# Patient Record
Sex: FEMALE | Race: BLACK OR AFRICAN AMERICAN | NOT HISPANIC OR LATINO | URBAN - METROPOLITAN AREA
[De-identification: names, ages, dates, MRNs, and addresses within clinical notes are randomized per-mention and may not be internally consistent; named-entity substitution may affect disease eponyms.]

---

## 2017-01-24 ENCOUNTER — INPATIENT (INPATIENT)
Facility: HOSPITAL | Age: 57
LOS: 7 days | Discharge: HOME | End: 2017-02-01
Attending: INTERNAL MEDICINE | Admitting: INTERNAL MEDICINE
Payer: MEDICAID

## 2017-01-24 VITALS
SYSTOLIC BLOOD PRESSURE: 127 MMHG | DIASTOLIC BLOOD PRESSURE: 102 MMHG | RESPIRATION RATE: 106 BRPM | WEIGHT: 89.95 LBS | HEART RATE: 106 BPM | OXYGEN SATURATION: 100 % | HEIGHT: 64 IN

## 2017-01-24 LAB
ALBUMIN SERPL ELPH-MCNC: 4.3 G/DL — SIGNIFICANT CHANGE UP (ref 3.3–5)
ALP SERPL-CCNC: 118 U/L — SIGNIFICANT CHANGE UP (ref 40–120)
ALT FLD-CCNC: 58 U/L — SIGNIFICANT CHANGE UP (ref 12–78)
ANION GAP SERPL CALC-SCNC: 7 MMOL/L — SIGNIFICANT CHANGE UP (ref 5–17)
APTT BLD: 26.8 SEC — LOW (ref 27.5–37.4)
AST SERPL-CCNC: 48 U/L — HIGH (ref 15–37)
BASOPHILS # BLD AUTO: 0.1 K/UL — SIGNIFICANT CHANGE UP (ref 0–0.2)
BASOPHILS NFR BLD AUTO: 1 % — SIGNIFICANT CHANGE UP (ref 0–2)
BILIRUB SERPL-MCNC: 0.4 MG/DL — SIGNIFICANT CHANGE UP (ref 0.2–1.2)
BUN SERPL-MCNC: 16 MG/DL — SIGNIFICANT CHANGE UP (ref 7–23)
CALCIUM SERPL-MCNC: 8.6 MG/DL — SIGNIFICANT CHANGE UP (ref 8.5–10.1)
CHLORIDE SERPL-SCNC: 107 MMOL/L — SIGNIFICANT CHANGE UP (ref 96–108)
CO2 SERPL-SCNC: 32 MMOL/L — HIGH (ref 22–31)
CREAT SERPL-MCNC: 0.88 MG/DL — SIGNIFICANT CHANGE UP (ref 0.5–1.3)
D DIMER BLD IA.RAPID-MCNC: <150 NG/ML DDU — SIGNIFICANT CHANGE UP
EOSINOPHIL # BLD AUTO: 0.2 K/UL — SIGNIFICANT CHANGE UP (ref 0–0.5)
EOSINOPHIL NFR BLD AUTO: 1.8 % — SIGNIFICANT CHANGE UP (ref 0–6)
FLUAV SPEC QL CULT: NEGATIVE — SIGNIFICANT CHANGE UP
FLUBV AG SPEC QL IA: NEGATIVE — SIGNIFICANT CHANGE UP
GLUCOSE SERPL-MCNC: 107 MG/DL — HIGH (ref 70–99)
HCG SERPL-ACNC: 2 MIU/ML — SIGNIFICANT CHANGE UP
HCT VFR BLD CALC: 38.3 % — SIGNIFICANT CHANGE UP (ref 34.5–45)
HGB BLD-MCNC: 12.9 G/DL — SIGNIFICANT CHANGE UP (ref 11.5–15.5)
INR BLD: 0.96 RATIO — SIGNIFICANT CHANGE UP (ref 0.88–1.16)
LACTATE SERPL-SCNC: 0.6 MMOL/L — LOW (ref 0.7–2)
LYMPHOCYTES # BLD AUTO: 1.6 K/UL — SIGNIFICANT CHANGE UP (ref 1–3.3)
LYMPHOCYTES # BLD AUTO: 16.4 % — SIGNIFICANT CHANGE UP (ref 13–44)
MCHC RBC-ENTMCNC: 30.1 PG — SIGNIFICANT CHANGE UP (ref 27–34)
MCHC RBC-ENTMCNC: 33.6 GM/DL — SIGNIFICANT CHANGE UP (ref 32–36)
MCV RBC AUTO: 89.8 FL — SIGNIFICANT CHANGE UP (ref 80–100)
MONOCYTES # BLD AUTO: 0.6 K/UL — SIGNIFICANT CHANGE UP (ref 0–0.9)
MONOCYTES NFR BLD AUTO: 6.6 % — SIGNIFICANT CHANGE UP (ref 2–14)
NEUTROPHILS # BLD AUTO: 7.2 K/UL — SIGNIFICANT CHANGE UP (ref 1.8–7.4)
NEUTROPHILS NFR BLD AUTO: 74.2 % — SIGNIFICANT CHANGE UP (ref 43–77)
PLATELET # BLD AUTO: 208 K/UL — SIGNIFICANT CHANGE UP (ref 150–400)
POTASSIUM SERPL-MCNC: 3.9 MMOL/L — SIGNIFICANT CHANGE UP (ref 3.5–5.3)
POTASSIUM SERPL-SCNC: 3.9 MMOL/L — SIGNIFICANT CHANGE UP (ref 3.5–5.3)
PROT SERPL-MCNC: 7.9 GM/DL — SIGNIFICANT CHANGE UP (ref 6–8.3)
PROTHROM AB SERPL-ACNC: 10.7 SEC — SIGNIFICANT CHANGE UP (ref 10–13.1)
RBC # BLD: 4.27 M/UL — SIGNIFICANT CHANGE UP (ref 3.8–5.2)
RBC # FLD: 13.4 % — SIGNIFICANT CHANGE UP (ref 11–15)
SODIUM SERPL-SCNC: 146 MMOL/L — HIGH (ref 135–145)
WBC # BLD: 9.7 K/UL — SIGNIFICANT CHANGE UP (ref 3.8–10.5)
WBC # FLD AUTO: 9.7 K/UL — SIGNIFICANT CHANGE UP (ref 3.8–10.5)

## 2017-01-24 PROCEDURE — 99285 EMERGENCY DEPT VISIT HI MDM: CPT

## 2017-01-24 PROCEDURE — 71010: CPT | Mod: 26

## 2017-01-24 PROCEDURE — 71250 CT THORAX DX C-: CPT | Mod: 26

## 2017-01-24 RX ORDER — SODIUM CHLORIDE 9 MG/ML
1000 INJECTION INTRAMUSCULAR; INTRAVENOUS; SUBCUTANEOUS ONCE
Qty: 0 | Refills: 0 | Status: COMPLETED | OUTPATIENT
Start: 2017-01-24 | End: 2017-01-24

## 2017-01-24 RX ORDER — ACETAMINOPHEN 500 MG
975 TABLET ORAL ONCE
Qty: 0 | Refills: 0 | Status: COMPLETED | OUTPATIENT
Start: 2017-01-24 | End: 2017-01-24

## 2017-01-24 RX ORDER — AZITHROMYCIN 500 MG/1
500 TABLET, FILM COATED ORAL ONCE
Qty: 0 | Refills: 0 | Status: COMPLETED | OUTPATIENT
Start: 2017-01-24 | End: 2017-01-24

## 2017-01-24 RX ORDER — MAGNESIUM SULFATE 500 MG/ML
1 VIAL (ML) INJECTION ONCE
Qty: 0 | Refills: 0 | Status: COMPLETED | OUTPATIENT
Start: 2017-01-24 | End: 2017-01-24

## 2017-01-24 RX ADMIN — Medication 40 MILLIGRAM(S): at 15:28

## 2017-01-24 RX ADMIN — Medication 975 MILLIGRAM(S): at 20:35

## 2017-01-24 RX ADMIN — AZITHROMYCIN 255 MILLIGRAM(S): 500 TABLET, FILM COATED ORAL at 22:35

## 2017-01-24 RX ADMIN — SODIUM CHLORIDE 1000 MILLILITER(S): 9 INJECTION INTRAMUSCULAR; INTRAVENOUS; SUBCUTANEOUS at 14:40

## 2017-01-24 RX ADMIN — Medication 975 MILLIGRAM(S): at 22:27

## 2017-01-24 RX ADMIN — Medication 100 GRAM(S): at 15:33

## 2017-01-24 NOTE — ED PROVIDER NOTE - CONSTITUTIONAL, MLM
normal... Thin appearing, well nourished, awake, alert, oriented to person, place, time/situation and in no apparent distress.

## 2017-01-24 NOTE — ED PROVIDER NOTE - PROGRESS NOTE DETAILS
pt refused ABG pt refused ct angio stating that she wants to consult her pmd, pt told that there is a concern for PE due to her recent travel and PE and cause death, pt is being very difficulty and states she will discuss it with her pmd tmr, when asked for the name of her pmd pt refused to give the name, she was sent back from ct

## 2017-01-24 NOTE — ED PROVIDER NOTE - OBJECTIVE STATEMENT
57 year old female with h/o asthma and HIV presents today c/o two week history of productive cough, sob and wheezing, she did see her PMD last week and given antibiotics, however, pt states that she went on a cruise and forgot to take the medications (-) Fevers (-) vomiting

## 2017-01-24 NOTE — ED PROVIDER NOTE - MEDICAL DECISION MAKING DETAILS
pt presented with cough, sob and wheezing, pt also recently travelled, pt refused abg, refused ct angio, d- dimer done and was negative, pt agreed to ct without contrast, zithromax, flu swabs, duonebs, solumedrol, and magnesium

## 2017-01-24 NOTE — ED ADULT TRIAGE NOTE - CHIEF COMPLAINT QUOTE
Wheezing prior to arrival, received two albuterol nebulizer treatments by [facility] w/ relief, hx of asthma, moist non-productive cough, denies fevers chills

## 2017-01-25 DIAGNOSIS — J45.42 MODERATE PERSISTENT ASTHMA WITH STATUS ASTHMATICUS: ICD-10-CM

## 2017-01-25 DIAGNOSIS — J45.902 UNSPECIFIED ASTHMA WITH STATUS ASTHMATICUS: ICD-10-CM

## 2017-01-25 DIAGNOSIS — Z98.891 HISTORY OF UTERINE SCAR FROM PREVIOUS SURGERY: Chronic | ICD-10-CM

## 2017-01-25 DIAGNOSIS — Z21 ASYMPTOMATIC HUMAN IMMUNODEFICIENCY VIRUS [HIV] INFECTION STATUS: ICD-10-CM

## 2017-01-25 DIAGNOSIS — F14.10 COCAINE ABUSE, UNCOMPLICATED: ICD-10-CM

## 2017-01-25 DIAGNOSIS — Z29.9 ENCOUNTER FOR PROPHYLACTIC MEASURES, UNSPECIFIED: ICD-10-CM

## 2017-01-25 LAB
AMPHET UR-MCNC: NEGATIVE — SIGNIFICANT CHANGE UP
ANION GAP SERPL CALC-SCNC: 7 MMOL/L — SIGNIFICANT CHANGE UP (ref 5–17)
APPEARANCE UR: CLEAR — SIGNIFICANT CHANGE UP
BACTERIA # UR AUTO: ABNORMAL
BARBITURATES UR SCN-MCNC: NEGATIVE — SIGNIFICANT CHANGE UP
BENZODIAZ UR-MCNC: NEGATIVE — SIGNIFICANT CHANGE UP
BILIRUB UR-MCNC: NEGATIVE — SIGNIFICANT CHANGE UP
BUN SERPL-MCNC: 16 MG/DL — SIGNIFICANT CHANGE UP (ref 7–23)
CALCIUM SERPL-MCNC: 8.6 MG/DL — SIGNIFICANT CHANGE UP (ref 8.5–10.1)
CHLORIDE SERPL-SCNC: 107 MMOL/L — SIGNIFICANT CHANGE UP (ref 96–108)
CO2 SERPL-SCNC: 32 MMOL/L — HIGH (ref 22–31)
COCAINE METAB.OTHER UR-MCNC: POSITIVE — SIGNIFICANT CHANGE UP
COLOR SPEC: YELLOW — SIGNIFICANT CHANGE UP
COMMENT - URINE: SIGNIFICANT CHANGE UP
CREAT SERPL-MCNC: 0.79 MG/DL — SIGNIFICANT CHANGE UP (ref 0.5–1.3)
DIFF PNL FLD: ABNORMAL
GLUCOSE SERPL-MCNC: 146 MG/DL — HIGH (ref 70–99)
GLUCOSE UR QL: NEGATIVE MG/DL — SIGNIFICANT CHANGE UP
HCT VFR BLD CALC: 34.5 % — SIGNIFICANT CHANGE UP (ref 34.5–45)
HGB BLD-MCNC: 12.1 G/DL — SIGNIFICANT CHANGE UP (ref 11.5–15.5)
KETONES UR-MCNC: NEGATIVE — SIGNIFICANT CHANGE UP
LACTATE SERPL-SCNC: 1.6 MMOL/L — SIGNIFICANT CHANGE UP (ref 0.7–2)
LACTATE SERPL-SCNC: 2.1 MMOL/L — HIGH (ref 0.7–2)
LEUKOCYTE ESTERASE UR-ACNC: NEGATIVE — SIGNIFICANT CHANGE UP
MCHC RBC-ENTMCNC: 30.4 PG — SIGNIFICANT CHANGE UP (ref 27–34)
MCHC RBC-ENTMCNC: 35 GM/DL — SIGNIFICANT CHANGE UP (ref 32–36)
MCV RBC AUTO: 87 FL — SIGNIFICANT CHANGE UP (ref 80–100)
METHADONE UR-MCNC: NEGATIVE — SIGNIFICANT CHANGE UP
NITRITE UR-MCNC: NEGATIVE — SIGNIFICANT CHANGE UP
OPIATES UR-MCNC: NEGATIVE — SIGNIFICANT CHANGE UP
PCP SPEC-MCNC: SIGNIFICANT CHANGE UP
PCP UR-MCNC: NEGATIVE — SIGNIFICANT CHANGE UP
PH UR: 5 — SIGNIFICANT CHANGE UP (ref 4.8–8)
PLATELET # BLD AUTO: 222 K/UL — SIGNIFICANT CHANGE UP (ref 150–400)
POTASSIUM SERPL-MCNC: 4.4 MMOL/L — SIGNIFICANT CHANGE UP (ref 3.5–5.3)
POTASSIUM SERPL-SCNC: 4.4 MMOL/L — SIGNIFICANT CHANGE UP (ref 3.5–5.3)
PROT UR-MCNC: 30 MG/DL
RBC # BLD: 3.96 M/UL — SIGNIFICANT CHANGE UP (ref 3.8–5.2)
RBC # FLD: 12.7 % — SIGNIFICANT CHANGE UP (ref 11–15)
SODIUM SERPL-SCNC: 146 MMOL/L — HIGH (ref 135–145)
SP GR SPEC: 1.02 — SIGNIFICANT CHANGE UP (ref 1.01–1.02)
THC UR QL: NEGATIVE — SIGNIFICANT CHANGE UP
UROBILINOGEN FLD QL: NEGATIVE MG/DL — SIGNIFICANT CHANGE UP
WBC # BLD: 6.1 K/UL — SIGNIFICANT CHANGE UP (ref 3.8–10.5)
WBC # FLD AUTO: 6.1 K/UL — SIGNIFICANT CHANGE UP (ref 3.8–10.5)
WBC UR QL: SIGNIFICANT CHANGE UP

## 2017-01-25 PROCEDURE — 99232 SBSQ HOSP IP/OBS MODERATE 35: CPT

## 2017-01-25 PROCEDURE — 99233 SBSQ HOSP IP/OBS HIGH 50: CPT

## 2017-01-25 RX ORDER — RALTEGRAVIR 400 MG/1
400 TABLET, FILM COATED ORAL
Qty: 0 | Refills: 0 | Status: DISCONTINUED | OUTPATIENT
Start: 2017-01-25 | End: 2017-02-01

## 2017-01-25 RX ORDER — RITONAVIR 100 MG/1
100 TABLET, FILM COATED ORAL
Qty: 0 | Refills: 0 | Status: DISCONTINUED | OUTPATIENT
Start: 2017-01-25 | End: 2017-01-26

## 2017-01-25 RX ORDER — RITONAVIR 100 MG/1
100 TABLET, FILM COATED ORAL
Qty: 0 | Refills: 0 | Status: DISCONTINUED | OUTPATIENT
Start: 2017-01-25 | End: 2017-01-25

## 2017-01-25 RX ORDER — IPRATROPIUM/ALBUTEROL SULFATE 18-103MCG
3 AEROSOL WITH ADAPTER (GRAM) INHALATION ONCE
Qty: 0 | Refills: 0 | Status: COMPLETED | OUTPATIENT
Start: 2017-01-25 | End: 2017-01-25

## 2017-01-25 RX ORDER — IPRATROPIUM/ALBUTEROL SULFATE 18-103MCG
3 AEROSOL WITH ADAPTER (GRAM) INHALATION EVERY 6 HOURS
Qty: 0 | Refills: 0 | Status: DISCONTINUED | OUTPATIENT
Start: 2017-01-25 | End: 2017-01-27

## 2017-01-25 RX ORDER — HEPARIN SODIUM 5000 [USP'U]/ML
5000 INJECTION INTRAVENOUS; SUBCUTANEOUS EVERY 12 HOURS
Qty: 0 | Refills: 0 | Status: DISCONTINUED | OUTPATIENT
Start: 2017-01-25 | End: 2017-02-01

## 2017-01-25 RX ORDER — FAMOTIDINE 10 MG/ML
20 INJECTION INTRAVENOUS DAILY
Qty: 0 | Refills: 0 | Status: DISCONTINUED | OUTPATIENT
Start: 2017-01-25 | End: 2017-02-01

## 2017-01-25 RX ORDER — DARUNAVIR 75 MG/1
800 TABLET, FILM COATED ORAL DAILY
Qty: 0 | Refills: 0 | Status: DISCONTINUED | OUTPATIENT
Start: 2017-01-25 | End: 2017-02-01

## 2017-01-25 RX ADMIN — HEPARIN SODIUM 5000 UNIT(S): 5000 INJECTION INTRAVENOUS; SUBCUTANEOUS at 17:17

## 2017-01-25 RX ADMIN — RALTEGRAVIR 400 MILLIGRAM(S): 400 TABLET, FILM COATED ORAL at 17:18

## 2017-01-25 RX ADMIN — FAMOTIDINE 20 MILLIGRAM(S): 10 INJECTION INTRAVENOUS at 11:39

## 2017-01-25 RX ADMIN — Medication 200 MILLIGRAM(S): at 16:22

## 2017-01-25 RX ADMIN — Medication 3 MILLILITER(S): at 07:07

## 2017-01-25 RX ADMIN — Medication 200 MILLIGRAM(S): at 22:06

## 2017-01-25 RX ADMIN — Medication 40 MILLIGRAM(S): at 13:17

## 2017-01-25 RX ADMIN — RITONAVIR 100 MILLIGRAM(S): 100 TABLET, FILM COATED ORAL at 06:46

## 2017-01-25 RX ADMIN — HEPARIN SODIUM 5000 UNIT(S): 5000 INJECTION INTRAVENOUS; SUBCUTANEOUS at 06:15

## 2017-01-25 RX ADMIN — Medication 125 MILLIGRAM(S): at 01:06

## 2017-01-25 RX ADMIN — Medication 3 MILLILITER(S): at 18:15

## 2017-01-25 RX ADMIN — RALTEGRAVIR 400 MILLIGRAM(S): 400 TABLET, FILM COATED ORAL at 06:16

## 2017-01-25 RX ADMIN — RITONAVIR 100 MILLIGRAM(S): 100 TABLET, FILM COATED ORAL at 17:46

## 2017-01-25 RX ADMIN — Medication 3 MILLILITER(S): at 00:41

## 2017-01-25 RX ADMIN — Medication 40 MILLIGRAM(S): at 22:04

## 2017-01-25 RX ADMIN — DARUNAVIR 800 MILLIGRAM(S): 75 TABLET, FILM COATED ORAL at 11:39

## 2017-01-25 RX ADMIN — Medication 3 MILLILITER(S): at 11:42

## 2017-01-25 RX ADMIN — Medication 40 MILLIGRAM(S): at 06:14

## 2017-01-25 NOTE — PROGRESS NOTE ADULT - SUBJECTIVE AND OBJECTIVE BOX
Patient is a 57y old  Female who presents with a chief complaint of Shortness of breath (2017 00:47)      INTERVAL HPI/OVERNIGHT EVENTS:   events noted    MEDICATIONS  (STANDING):  ALBUTerol/ipratropium for Nebulization 3milliLiter(s) Nebulizer every 6 hours  methylPREDNISolone sodium succinate Injectable 40milliGRAM(s) IV Push every 8 hours  heparin  Injectable 5000Unit(s) SubCutaneous every 12 hours  raltegravir Tablet 400milliGRAM(s) Oral two times a day  darunavir 800milliGRAM(s) Oral daily  ritonavir Capsule 100milliGRAM(s) Oral two times a day  famotidine    Tablet 20milliGRAM(s) Oral daily    MEDICATIONS  (PRN):      Allergies    No Known Allergies    Intolerances        REVIEW OF SYSTEMS:  CONSTITUTIONAL: No fever, weight loss, or fatigue  EYES: No eye pain, visual disturbances, or discharge  ENMT:  No difficulty hearing, tinnitus, vertigo; No sinus or throat pain  NECK: No pain or stiffness  BREASTS: No pain, masses, or nipple discharge  RESPIRATORY: No cough, wheezing, chills or hemoptysis; No shortness of breath  CARDIOVASCULAR: No chest pain, palpitations, dizziness, or leg swelling  GASTROINTESTINAL: No abdominal or epigastric pain. No nausea, vomiting, or hematemesis; No diarrhea or constipation. No melena or hematochezia.  GENITOURINARY: No dysuria, frequency, hematuria, or incontinence  NEUROLOGICAL: No headaches, memory loss, loss of strength, numbness, or tremors  SKIN: No itching, burning, rashes, or lesions   LYMPH NODES: No enlarged glands  ENDOCRINE: No heat or cold intolerance; No hair loss  MUSCULOSKELETAL: No joint pain or swelling; No muscle, back, or extremity pain  PSYCHIATRIC: No depression, anxiety, mood swings, or difficulty sleeping  HEME/LYMPH: No easy bruising, or bleeding gums  ALLERGY AND IMMUNOLOGIC: No hives or eczema    Vital Signs Last 24 Hrs  T(C): 37, Max: 37.5 ( @ 18:16)  T(F): 98.6, Max: 99.5 ( @ 18:16)  HR: 67 (17 - 106)  BP: 128/72 (97/60 - 128/72)  BP(mean): --  RR: 18 (16 - 106)  SpO2: 100% (97% - 100%)    PHYSICAL EXAM:  GENERAL: NAD, well-groomed, well-developed  HEAD:  Atraumatic, Normocephalic  EYES: EOMI, PERRLA, conjunctiva and sclera clear  ENMT: No tonsillar erythema, exudates, or enlargement; Moist mucous membranes, Good dentition, No lesions  NECK: Supple, No JVD, Normal thyroid  NERVOUS SYSTEM:  Alert & Oriented X3, Good concentration; Motor Strength 5/5 B/L upper and lower extremities; DTRs 2+ intact and symmetric  CHEST/LUNG: Clear to percussion bilaterally; No rales, rhonchi, wheezing, or rubs  HEART: Regular rate and rhythm; No murmurs, rubs, or gallops  ABDOMEN: Soft, Nontender, Nondistended; Bowel sounds present  EXTREMITIES:  2+ Peripheral Pulses, No clubbing, cyanosis, or edema  LYMPH: No lymphadenopathy noted  SKIN: No rashes or lesions    LABS:                        12.9   9.7   )-----------( 208      ( 2017 14:37 )             38.3     2017 14:37    146    |  107    |  16     ----------------------------<  107    3.9     |  32     |  0.88     Ca    8.6        2017 14:37    TPro  7.9    /  Alb  4.3    /  TBili  0.4    /  DBili  x      /  AST  48     /  ALT  58     /  AlkPhos  118    2017 14:37    PT/INR - ( 2017 18:32 )   PT: 10.7 sec;   INR: 0.96 ratio         PTT - ( 2017 18:32 )  PTT:26.8 sec  Urinalysis Basic - ( 2017 04:52 )    Color: Yellow / Appearance: Clear / S.020 / pH: x  Gluc: x / Ketone: Negative  / Bili: Negative / Urobili: Negative mg/dL   Blood: x / Protein: 30 mg/dL / Nitrite: Negative   Leuk Esterase: Negative / RBC: x / WBC 0-2   Sq Epi: x / Non Sq Epi: x / Bacteria: Few      CAPILLARY BLOOD GLUCOSE      RADIOLOGY & ADDITIONAL TESTS:    Imaging Personally Reviewed:  [ ] YES  [ ] NO    Consultant(s) Notes Reviewed:  [ ] YES  [ ] NO    Care Discussed with Consultants/Other Providers [ ] YES  [ ] NO

## 2017-01-25 NOTE — PROGRESS NOTE ADULT - ASSESSMENT
pt is known to have asthma and also uses cocaine .admitted with short ness of brath. she also have history  hiv.shein on multiple drugs

## 2017-01-25 NOTE — CONSULT NOTE ADULT - SUBJECTIVE AND OBJECTIVE BOX
Infectious Diseases - Attending at Dr. Gaffney    HPI:  57 year old female with PMH of asthma, HIV, and cocaine abuse and PSH of appendectomy and  presents to ED complaining of shortness of breath which began on the . Patient admits to cough with sputum production and wheezing. Patient also admits to urinary frequency but denies dysuria. Patient states her last drug use was also on the  and that she usually spends about 20-40 dollars a day on crack/ cocaine. Patient also admits to smoking about 5 cigarets a day, but admits to smoking a pack per day in the past for about 30 years. Patient admits to drinking 2 wine coolers daily. (2017 00:47)  Cough and breathing slightly better,no PNA on ct chest. Pt compliant with HIV meds and is undetectable for a long time.denies nay fever or chills. UA neg for UTI. Was given a dose of azithromycin       PAST MEDICAL & SURGICAL HISTORY:  Cocaine abuse  Asthma  HIV (human immunodeficiency virus infection)  H/O:   S/P appendectomy      Allergies    No Known Allergies    Intolerances        FAMILY HISTORY:  Family history of drug abuse (Mother)  No pertinent family history in first degree relatives      SOCIAL HISTORY:alcohol + ,coccaine+, smoking+    REVIEW OF SYSTEMS:    Constitutional: No fever, weight loss or fatigue  Eyes: No eye pain, visual disturbances, or discharge  ENT:  No difficulty hearing, tinnitus, vertigo; No sinus or throat pain  Neck: No pain or stiffness  Respiratory: + cough, wheezing, SOB, No hemoptysis  Cardiovascular: No chest pain, palpitations, shortness of breath, dizziness or leg swelling  Gastrointestinal: No abdominal or epigastric pain. No nausea, vomiting or hematemesis; No diarrhea or constipation. No melena or hematochezia.  Genitourinary: No dysuria,+ urgency  Rectal: No pain, hemorrhoids or incontinence  Neurological: No headaches, memory loss, loss of strength, numbness or tremors  Skin: No itching, burning, rashes or lesions   Lymph Nodes: No enlarged glands  Endocrine: No heat or cold intolerance; No hair loss  Musculoskeletal: No joint pain or swelling; No muscle, back or extremity pain  Psychiatric: No depression, anxiety, mood swings or difficulty sleeping  Heme/Lymph: No easy bruising or bleeding gums  Allergy and Immunologic: No hives or eczema    MEDICATIONS  (STANDING):  ALBUTerol/ipratropium for Nebulization 3milliLiter(s) Nebulizer every 6 hours  methylPREDNISolone sodium succinate Injectable 40milliGRAM(s) IV Push every 8 hours  heparin  Injectable 5000Unit(s) SubCutaneous every 12 hours  raltegravir Tablet 400milliGRAM(s) Oral two times a day  darunavir 800milliGRAM(s) Oral daily  ritonavir Capsule 100milliGRAM(s) Oral two times a day  famotidine    Tablet 20milliGRAM(s) Oral daily    MEDICATIONS  (PRN):  guaiFENesin    Syrup 200milliGRAM(s) Oral every 6 hours PRN Cough      Vital Signs Last 24 Hrs  T(C): 36.3, Max: 37.5 ( @ 18:16)  T(F): 97.4, Max: 99.5 ( @ 18:16)  HR: 85 (67 - 86)  BP: 122/70 (97/60 - 128/72)  BP(mean): --  RR: 18 (16 - 20)  SpO2: 100% (97% - 100%)    PHYSICAL EXAM:    Constitutional: NAD, cachectic  HEENT: PERRLA, EOMI, Normal Hearing, MMM  Neck: No LAD, No JVD  Back: Normal spine flexure, No CVA tenderness  Respiratory: expiratory wheezes+  Cardiovascular: S1 and S2, RRR, no M/G/R  Gastrointestinal: BS+, soft, NT/ND  Extremities: No peripheral edema  Vascular: 2+ peripheral pulses  Neurological: A/O x 3, no focal deficits  Skin: No rashes      LABS:                        12.1   6.1   )-----------( 222      ( 2017 07:38 )             34.5     2017 07:38    146    |  107    |  16     ----------------------------<  146    4.4     |  32     |  0.79     Ca    8.6        2017 07:38    TPro  7.9    /  Alb  4.3    /  TBili  0.4    /  DBili  x      /  AST  48     /  ALT  58     /  AlkPhos  118    2017 14:37    PT/INR - ( 2017 18:32 )   PT: 10.7 sec;   INR: 0.96 ratio         PTT - ( 2017 18:32 )  PTT:26.8 sec  Urinalysis Basic - ( 2017 04:52 )    Color: Yellow / Appearance: Clear / S.020 / pH: x  Gluc: x / Ketone: Negative  / Bili: Negative / Urobili: Negative mg/dL   Blood: x / Protein: 30 mg/dL / Nitrite: Negative   Leuk Esterase: Negative / RBC: x / WBC 0-2   Sq Epi: x / Non Sq Epi: x / Bacteria: Few        MICROBIOLOGY:  EXAM:  CT CHEST                            PROCEDURE DATE:  2017        INTERPRETATION:  CLINICAL INFORMATION: Cough and shortness of breath    COMPARISON: No prior CT studies are available for comparison.    PROCEDURE:     Serial axial sections through the chest were obtained without   administration of nonionic intravenous contrast. Sagittal, MIP and   coronal reformats were then generated from the axial images.    FINDINGS:     LUNG AND LARGE AIRWAYS: There is a small amount of inspissated mucus   within the yoana and proximal left mainstem bronchus. The   tracheobronchial tree is otherwise patent. There is moderate to severe   emphysema. Scattered calcified granulomas are noted. No focal   consolidation.  PLEURA: No pleural effusion  VESSELS: Within normal limits.  HEART: Normal size. No pericardial effusion.  MEDIASTINUM AND CHRISTIANO: Within normal limits.  CHEST WALL AND LOWER NECK: Within normal limits.    UPPER ABDOMEN: There is a small hiatal hernia versus debris within the   distal esophagus.    BONES: Within normal limits.    IMPRESSION:     Moderate to severe emphysema. No focal consolidation.    Small hiatal hernia versus debris within the distal esophagus.  RECENT CULTURES:        RADIOLOGY & ADDITIONAL STUDIES:
Patient is a 57y old  Female who presents with a chief complaint of Shortness of breath (2017 00:47)      HPI:  57 year old female with PMH of Asthma/COPD, HIV, and cocaine abuse and PSH of appendectomy and  presents to ED complaining of shortness of breath which began on the . Patient admits to cough with sputum production and wheezing. Patient also admits to urinary frequency but denies dysuria. Patient states her last drug use was also on the  and that she usually spends about 20-40 dollars a day on crack/ cocaine. Patient also admits to smoking about 5 cigarets a day, but admits to smoking a pack per day in the past for about 30 years. Patient admits to drinking 2 wine coolers daily.  Denies high fever, chills or chest pain. Also denies catching a cold.    PAST MEDICAL & SURGICAL HISTORY:  Cocaine abuse  Asthma/COPD  HIV (human immunodeficiency virus infection)  H/O:   S/P appendectomy      FAMILY HISTORY:  Family history of drug abuse (Mother)  No pertinent family history in first degree relatives    SOCIAL HISTORY: BMI (kg/m2): 14.5 .Active smoker for 38 years. active alcohol and cocaine abuse.    Allergies  No Known Allergies    MEDICATIONS  (STANDING):  ALBUTerol/ipratropium for Nebulization 3milliLiter(s) Nebulizer every 6 hours  methylPREDNISolone sodium succinate Injectable 40milliGRAM(s) IV Push every 8 hours  heparin  Injectable 5000Unit(s) SubCutaneous every 12 hours  raltegravir Tablet 400milliGRAM(s) Oral two times a day  darunavir 800milliGRAM(s) Oral daily  ritonavir Capsule 100milliGRAM(s) Oral two times a day  famotidine    Tablet 20milliGRAM(s) Oral daily    MEDICATIONS  (PRN):  guaiFENesin    Syrup 200milliGRAM(s) Oral every 6 hours PRN Cough    REVIEW OF SYSTEMS:    Constitutional:            No fever, weight loss or fatigue  HEENT:                         No difficulty hearing, tinnitus, vertigo; No sinus or throat pain  Respiratory:               sob and cough.  Cardiovascular:           No chest pain, palpitations  Gastrointestinal:        No abdominal or epigastric pain. No N/V/diarrhea or hematemesis  Genitourinary:            No dysuria, frequency, hematuria or incontinence  SKIN:                             no rash  Musculoskeletal:        No joint pain or swelling  Extremities:                No swelling  Neurological:              No headaches  Psychiatric:                 No depression, anxiety    Vital Signs Last 24 Hrs  T(C): 36.3, Max: 37.5 ( @ 18:16)  T(F): 97.4, Max: 99.5 ( @ 18:16)  HR: 85 (67 - 86)  BP: 122/70 (97/60 - 128/72)  BP(mean): --  RR: 18 (16 - 20)  SpO2: 100% (97% - 100%)    PHYSICAL EXAM:  GEN:         Awake, responsive and comfortable.  HEENT:    Normal.    RESP:     decreased air entry.  CVS:         Regular rate and rhythm.   ABD:         Soft, non-tender, non-distended;   :            No costovertebral angle tenderness  SKIN:          Warm and dry.  EXTR:         loss of skin turgor.  CNS:           Intact sensory and motor function.  PSYCH:        cooperative, anxious     LABS:                        12.1   6.1   )-----------( 222      ( 2017 07:38 )             34.5     2017 07:38    146    |  107    |  16     ----------------------------<  146    4.4     |  32     |  0.79     Ca    8.6        2017 07:38    TPro  7.9    /  Alb  4.3    /  TBili  0.4    /  DBili  x      /  AST  48     /  ALT  58     /  AlkPhos  118    2017 14:37    PT/INR - ( 2017 18:32 )   PT: 10.7 sec;   INR: 0.96 ratio         PTT - ( 2017 18:32 )  PTT:26.8 sec    Urinalysis Basic - ( 2017 04:52 )    Color: Yellow / Appearance: Clear / S.020 / pH: x  Gluc: x / Ketone: Negative  / Bili: Negative / Urobili: Negative mg/dL   Blood: x / Protein: 30 mg/dL / Nitrite: Negative   Leuk Esterase: Negative / RBC: x / WBC 0-2   Sq Epi: x / Non Sq Epi: x / Bacteria: Few    EKG: sinus rhythm    RADIOLOGY & ADDITIONAL STUDIES:    EXAM:  CT CHEST                            PROCEDURE DATE:  2017        INTERPRETATION:  CLINICAL INFORMATION: Cough and shortness of breath    COMPARISON: No prior CT studies are available for comparison.    PROCEDURE:     Serial axial sections through the chest were obtained without   administration of nonionic intravenous contrast. Sagittal, MIP and   coronal reformats were then generated from the axial images.    FINDINGS:     LUNG AND LARGE AIRWAYS: There is a small amount of inspissated mucus   within the yoana and proximal left mainstem bronchus. The   tracheobronchial tree is otherwise patent. There is moderate to severe   emphysema. Scattered calcified granulomas are noted. No focal   consolidation.  PLEURA: No pleural effusion  VESSELS: Within normal limits.  HEART: Normal size. No pericardial effusion.  MEDIASTINUM AND CHRISTIANO: Within normal limits.  CHEST WALL AND LOWER NECK: Within normal limits.    UPPER ABDOMEN: There is a small hiatal hernia versus debris within the   distal esophagus.    BONES: Within normal limits.    IMPRESSION:     Moderate to severe emphysema. No focal consolidation.    Small hiatal hernia versus debris within the distal esophagus.    ARNOLDO ASTORGA M.D., ATTENDING RADIOLOGIST  This document has been electronically signed. 2017  8:54AM      ASSESSMENT AND PLAN:  ·	SOB with COUGH.  ·	Acute COPD exacerbation.  ·	Cocaine abuse.  ·	Alcohol abuse.  ·	Tobacco abuse.  ·	HIV.    Continue O2, nebulizer and steroids.  Smoking cessation and abstinence from alcohol and cocaine.  Watch for alcohol withdrawal.  PFT out pt.  DVT prophylaxis.

## 2017-01-25 NOTE — CHART NOTE - NSCHARTNOTEFT_GEN_A_CORE
patient seen/examined at bed side,  h and p, labs, imaging reviewed, d/w patient, all questions answered,  will continue current Rx and follow with ID and pulmonary.  Pt. will be followed by hospitalist team .

## 2017-01-25 NOTE — PATIENT PROFILE ADULT. - VISION (WITH CORRECTIVE LENSES IF THE PATIENT USUALLY WEARS THEM):
Normal vision: sees adequately in most situations; can see medication labels, newsprint/wears glasses Partially impaired: cannot see medication labels or newsprint, but can see obstacles in path, and the surrounding layout; can count fingers at arm's length/wears glasses

## 2017-01-25 NOTE — H&P ADULT. - HISTORY OF PRESENT ILLNESS
57 year old female with PMH of asthma, HIV, and cocaine abuse and PSH of appendectomy and  presents to ED complaining of shortness of breath which began on the . Patient admits to cough with sputum production and wheezing. Patient also admits to urinary frequency but denies dysuria. Patient states her last drug use was also on the  and that she usually spends about 20-40 dollars a day on crack/ cocaine. Patient also admits to smoking about 5 cigarets a day, but admits to smoking a pack per day in the past for about 30 years. Patient admits to drinking 2 wine coolers daily.

## 2017-01-26 DIAGNOSIS — J44.0 CHRONIC OBSTRUCTIVE PULMONARY DISEASE WITH ACUTE LOWER RESPIRATORY INFECTION: ICD-10-CM

## 2017-01-26 DIAGNOSIS — J40 BRONCHITIS, NOT SPECIFIED AS ACUTE OR CHRONIC: ICD-10-CM

## 2017-01-26 DIAGNOSIS — E87.0 HYPEROSMOLALITY AND HYPERNATREMIA: ICD-10-CM

## 2017-01-26 DIAGNOSIS — E43 UNSPECIFIED SEVERE PROTEIN-CALORIE MALNUTRITION: ICD-10-CM

## 2017-01-26 DIAGNOSIS — F10.10 ALCOHOL ABUSE, UNCOMPLICATED: ICD-10-CM

## 2017-01-26 PROCEDURE — 99232 SBSQ HOSP IP/OBS MODERATE 35: CPT

## 2017-01-26 PROCEDURE — 99233 SBSQ HOSP IP/OBS HIGH 50: CPT

## 2017-01-26 RX ORDER — RITONAVIR 100 MG/1
1 TABLET, FILM COATED ORAL
Qty: 0 | Refills: 0 | COMMUNITY

## 2017-01-26 RX ORDER — BENZOCAINE AND MENTHOL 5; 1 G/100ML; G/100ML
1 LIQUID ORAL
Qty: 0 | Refills: 0 | Status: DISCONTINUED | OUTPATIENT
Start: 2017-01-26 | End: 2017-02-01

## 2017-01-26 RX ORDER — SODIUM CHLORIDE 9 MG/ML
1000 INJECTION, SOLUTION INTRAVENOUS
Qty: 0 | Refills: 0 | Status: DISCONTINUED | OUTPATIENT
Start: 2017-01-26 | End: 2017-01-27

## 2017-01-26 RX ORDER — FOLIC ACID 0.8 MG
1 TABLET ORAL DAILY
Qty: 0 | Refills: 0 | Status: DISCONTINUED | OUTPATIENT
Start: 2017-01-26 | End: 2017-02-01

## 2017-01-26 RX ORDER — THIAMINE MONONITRATE (VIT B1) 100 MG
100 TABLET ORAL DAILY
Qty: 0 | Refills: 0 | Status: DISCONTINUED | OUTPATIENT
Start: 2017-01-26 | End: 2017-02-01

## 2017-01-26 RX ORDER — RITONAVIR 100 MG/1
100 TABLET, FILM COATED ORAL DAILY
Qty: 0 | Refills: 0 | Status: DISCONTINUED | OUTPATIENT
Start: 2017-01-26 | End: 2017-02-01

## 2017-01-26 RX ADMIN — RALTEGRAVIR 400 MILLIGRAM(S): 400 TABLET, FILM COATED ORAL at 05:38

## 2017-01-26 RX ADMIN — RALTEGRAVIR 400 MILLIGRAM(S): 400 TABLET, FILM COATED ORAL at 17:24

## 2017-01-26 RX ADMIN — DARUNAVIR 800 MILLIGRAM(S): 75 TABLET, FILM COATED ORAL at 11:22

## 2017-01-26 RX ADMIN — Medication 3 MILLILITER(S): at 17:58

## 2017-01-26 RX ADMIN — Medication 200 MILLIGRAM(S): at 12:25

## 2017-01-26 RX ADMIN — Medication 3 MILLILITER(S): at 01:47

## 2017-01-26 RX ADMIN — Medication 40 MILLIGRAM(S): at 05:36

## 2017-01-26 RX ADMIN — Medication 200 MILLIGRAM(S): at 21:51

## 2017-01-26 RX ADMIN — FAMOTIDINE 20 MILLIGRAM(S): 10 INJECTION INTRAVENOUS at 11:21

## 2017-01-26 RX ADMIN — HEPARIN SODIUM 5000 UNIT(S): 5000 INJECTION INTRAVENOUS; SUBCUTANEOUS at 17:25

## 2017-01-26 RX ADMIN — RITONAVIR 100 MILLIGRAM(S): 100 TABLET, FILM COATED ORAL at 05:38

## 2017-01-26 RX ADMIN — RITONAVIR 100 MILLIGRAM(S): 100 TABLET, FILM COATED ORAL at 17:24

## 2017-01-26 RX ADMIN — HEPARIN SODIUM 5000 UNIT(S): 5000 INJECTION INTRAVENOUS; SUBCUTANEOUS at 05:38

## 2017-01-26 RX ADMIN — BENZOCAINE AND MENTHOL 1 LOZENGE: 5; 1 LIQUID ORAL at 23:45

## 2017-01-26 RX ADMIN — Medication 40 MILLIGRAM(S): at 21:51

## 2017-01-26 RX ADMIN — Medication 200 MILLIGRAM(S): at 05:39

## 2017-01-26 RX ADMIN — Medication 3 MILLILITER(S): at 12:15

## 2017-01-26 RX ADMIN — Medication 3 MILLILITER(S): at 06:45

## 2017-01-26 RX ADMIN — Medication 3 MILLILITER(S): at 23:22

## 2017-01-26 RX ADMIN — Medication 40 MILLIGRAM(S): at 14:23

## 2017-01-26 RX ADMIN — SODIUM CHLORIDE 75 MILLILITER(S): 9 INJECTION, SOLUTION INTRAVENOUS at 14:22

## 2017-01-26 NOTE — PROGRESS NOTE ADULT - ASSESSMENT
58 y/o F with h/o cocaine/alcohol abuse, p/w sob and cough, likely copd exacerbation 56 y/o F with h/o cocaine/alcohol abuse, p/w sob and cough, likely copd exacerbation, h/o smoking crack, but no h/o asthma in childhood , as per pt. it all started after the age of 44 , also with hiv , on [place]art, h/o cocaine and etoh abuse.

## 2017-01-26 NOTE — PROGRESS NOTE ADULT - SUBJECTIVE AND OBJECTIVE BOX
Patient is a 57y old  Female who presents with a chief complaint of Shortness of breath (2017 00:47)      INTERVAL HPI / OVERNIGHT EVENTS: cough and dyspnea    MEDICATIONS  (STANDING):  ALBUTerol/ipratropium for Nebulization 3milliLiter(s) Nebulizer every 6 hours  methylPREDNISolone sodium succinate Injectable 40milliGRAM(s) IV Push every 8 hours  heparin  Injectable 5000Unit(s) SubCutaneous every 12 hours  raltegravir Tablet 400milliGRAM(s) Oral two times a day  darunavir 800milliGRAM(s) Oral daily  famotidine    Tablet 20milliGRAM(s) Oral daily  ritonavir Tablet 100milliGRAM(s) Oral daily  dextrose 5% + sodium chloride 0.9%. 1000milliLiter(s) IV Continuous <Continuous>  thiamine 100milliGRAM(s) Oral daily  folic acid 1milliGRAM(s) Oral daily    MEDICATIONS  (PRN):  guaiFENesin    Syrup 200milliGRAM(s) Oral every 6 hours PRN Cough  LORazepam     Tablet 2milliGRAM(s) Oral every 2 hours PRN CIWA-Ar score increase by 2 points and a total score of 7 or less      Vital Signs Last 24 Hrs  T(C): 36.8, Max: 36.8 ( @ 18:09)  T(F): 98.2, Max: 98.2 ( @ 18:09)  HR: 89 (74 - 104)  BP: 136/76 (113/68 - 136/76)  BP(mean): --  RR: 18 (17 - 18)  SpO2: 94% (93% - 98%)    PHYSICAL EXAM:    Constitutional: NAD,cachectic  Respiratory: wheezing better than yesterday  Cardiovascular: S1 and S2, RRR, no M/G/R  Gastrointestinal: BS+, soft, NT/ND  Extremities: No peripheral edema  Vascular: 2+ peripheral pulses  Skin: No rashes      LABS:                        12.1   6.1   )-----------( 222      ( 2017 07:38 )             34.5     2017 07:38    146    |  107    |  16     ----------------------------<  146    4.4     |  32     |  0.79     Ca    8.6        2017 07:38        Urinalysis Basic - ( 2017 04:52 )    Color: Yellow / Appearance: Clear / S.020 / pH: x  Gluc: x / Ketone: Negative  / Bili: Negative / Urobili: Negative mg/dL   Blood: x / Protein: 30 mg/dL / Nitrite: Negative   Leuk Esterase: Negative / RBC: x / WBC 0-2   Sq Epi: x / Non Sq Epi: x / Bacteria: Few          MICROBIOLOGY:  RECENT CULTURES:   .Blood Blood XXXX XXXX   No growth to date.          RADIOLOGY & ADDITIONAL STUDIES:

## 2017-01-26 NOTE — PROGRESS NOTE ADULT - SUBJECTIVE AND OBJECTIVE BOX
INTERVAL HPI/OVERNIGHT EVENTS:  57 year old female with PMH of Asthma/COPD, HIV, and cocaine abuse and PSH of appendectomy and  presents to ED complaining of shortness of breath which began on the . Patient admits to cough with sputum production and wheezing. Patient also admits to urinary frequency but denies dysuria. Patient states her last drug use was also on the  and that she usually spends about 20-40 dollars a day on crack/ cocaine. Patient also admits to smoking about 5 cigarets a day, but admits to smoking a pack per day in the past for about 30 years. Patient admits to drinking 2 wine coolers daily.  Denies high fever, chills or chest pain. Also denies catching a cold.  Feels some what better.    Vital Signs Last 24 Hrs  T(C): 36.5, Max: 36.8 ( @ 17:38)  T(F): 97.7, Max: 98.2 ( @ 17:38)  HR: 94 (74 - 95)  BP: 113/68 (113/68 - 132/84)  BP(mean): --  RR: 17 (17 - 18)  SpO2: 93% (93% - 99%)    PHYSICAL EXAM:  GEN:         Awake, responsive and comfortable.  HEENT:    Normal.    RESP:       no wheezing.  CVS:           Regular rate and rhythm.   ABD:         Soft, non-tender, non-distended;   :             No costovertebral angle tenderness  EXTR:            No clubbing, cyanosis or edema  CNS:              Intact sensory and motor function.    MEDICATIONS  (STANDING):  ALBUTerol/ipratropium for Nebulization 3milliLiter(s) Nebulizer every 6 hours  methylPREDNISolone sodium succinate Injectable 40milliGRAM(s) IV Push every 8 hours  heparin  Injectable 5000Unit(s) SubCutaneous every 12 hours  raltegravir Tablet 400milliGRAM(s) Oral two times a day  darunavir 800milliGRAM(s) Oral daily  famotidine    Tablet 20milliGRAM(s) Oral daily  ritonavir Tablet 100milliGRAM(s) Oral daily  dextrose 5% + sodium chloride 0.9%. 1000milliLiter(s) IV Continuous <Continuous>    MEDICATIONS  (PRN):  guaiFENesin    Syrup 200milliGRAM(s) Oral every 6 hours PRN Cough  LORazepam     Tablet 2milliGRAM(s) Oral every 2 hours PRN CIWA-Ar score increase by 2 points and a total score of 7 or less    LABS:                        12.1   6.1   )-----------( 222      ( 2017 07:38 )             34.5     2017 07:38    146    |  107    |  16     ----------------------------<  146    4.4     |  32     |  0.79     Ca    8.6        2017 07:38    TPro  7.9    /  Alb  4.3    /  TBili  0.4    /  DBili  x      /  AST  48     /  ALT  58     /  AlkPhos  118    2017 14:37    PT/INR - ( 2017 18:32 )   PT: 10.7 sec;   INR: 0.96 ratio         PTT - ( 2017 18:32 )  PTT:26.8 sec    Urinalysis Basic - ( 2017 04:52 )    Color: Yellow / Appearance: Clear / S.020 / pH: x  Gluc: x / Ketone: Negative  / Bili: Negative / Urobili: Negative mg/dL   Blood: x / Protein: 30 mg/dL / Nitrite: Negative   Leuk Esterase: Negative / RBC: x / WBC 0-2   Sq Epi: x / Non Sq Epi: x / Bacteria: Few    ASSESSMENT AND PLAN:  ·	SOB with COUGH.  ·	Acute COPD exacerbation.  ·	Corona virus tracheobronchitis  ·	Cocaine abuse.  ·	Alcohol abuse.  ·	Tobacco abuse.  ·	HIV.    Continue O2, nebulizer and steroids.

## 2017-01-26 NOTE — PROGRESS NOTE ADULT - SUBJECTIVE AND OBJECTIVE BOX
CHIEF COMPLAINT/INTERVAL HISTORY:    Patient is a 57y old  Female who presents with a chief complaint of Shortness of breath (2017 00:47)      HPI:  57 year old female with PMH of asthma, HIV, and cocaine abuse and PSH of appendectomy and  presents to ED complaining of shortness of breath which began on the . Patient admits to cough with sputum production and wheezing. Patient also admits to urinary frequency but denies dysuria. Patient states her last drug use was also on the  and that she usually spends about 20-40 dollars a day on crack/ cocaine. Patient also admits to smoking about 5 cigarets a day, but admits to smoking a pack per day in the past for about 30 years. Patient admits to drinking 2 wine coolers daily. (2017 00:47)      SUBJECTIVE & OBJECTIVE: Pt seen and examined at bedside.   c/o cough, sob, but feels better than before, gives h/o smoking crack fro many years, still actively uses cocaine, and drinks 1 cooler of wine daily, PTA  pt. states  asthma started at age of 44 years, was never asthmatic as a child or growing up,. never had any PFTs . Denies any h/o intubation, has nebulizer at home.  Denies chest pain, nausea/vomiting/diarrhea, No dizziness, HA or blurry vision, all other ROS negative.         Vital Signs Last 24 Hrs  T(C): 36.5, Max: 36.8 ( @ 17:38)  T(F): 97.7, Max: 98.2 ( @ 17:38)  HR: 94 (74 - 95)  BP: 113/68 (113/68 - 132/84)  BP(mean): --  ABP: --  ABP(mean): --  RR: 17 (17 - 18)  SpO2: 93% (93% - 100%)        MEDICATIONS  (STANDING):  ALBUTerol/ipratropium for Nebulization 3milliLiter(s) Nebulizer every 6 hours  methylPREDNISolone sodium succinate Injectable 40milliGRAM(s) IV Push every 8 hours  heparin  Injectable 5000Unit(s) SubCutaneous every 12 hours  raltegravir Tablet 400milliGRAM(s) Oral two times a day  darunavir 800milliGRAM(s) Oral daily  famotidine    Tablet 20milliGRAM(s) Oral daily  ritonavir Tablet 100milliGRAM(s) Oral daily  dextrose 5% + sodium chloride 0.9%. 1000milliLiter(s) IV Continuous <Continuous>    MEDICATIONS  (PRN):  guaiFENesin    Syrup 200milliGRAM(s) Oral every 6 hours PRN Cough  LORazepam     Tablet 2milliGRAM(s) Oral every 2 hours PRN CIWA-Ar score increase by 2 points and a total score of 7 or less        PHYSICAL EXAM:    GENERAL: NAD, AAOx3, cachectic, ill looking  HEAD:  Atraumatic, Normocephalic  EYES: EOMI, PERRLA, conjunctiva and sclera clear  ENMT: Moist mucous membranes  NECK: Supple, No JVD  NERVOUS SYSTEM:  Alert & Oriented X3, Motor Strength 5/5 B/L upper and lower extremities;  CHEST/LUNG: generalized decreased BS, b/l wheezing +  HEART: Regular rate and rhythm;  ABDOMEN: Soft, Nontender, Nondistended; Bowel sounds present  EXTREMITIES:  No cyanosis, or edema    LABS:                        12.1   6.1   )-----------( 222      ( 2017 07:38 )             34.5     2017 07:38    146    |  107    |  16     ----------------------------<  146    4.4     |  32     |  0.79     Ca    8.6        2017 07:38    TPro  7.9    /  Alb  4.3    /  TBili  0.4    /  DBili  x      /  AST  48     /  ALT  58     /  AlkPhos  118    2017 14:37    PT/INR - ( 2017 18:32 )   PT: 10.7 sec;   INR: 0.96 ratio         PTT - ( 2017 18:32 )  PTT:26.8 sec  Urinalysis Basic - ( 2017 04:52 )    Color: Yellow / Appearance: Clear / S.020 / pH: x  Gluc: x / Ketone: Negative  / Bili: Negative / Urobili: Negative mg/dL   Blood: x / Protein: 30 mg/dL / Nitrite: Negative   Leuk Esterase: Negative / RBC: x / WBC 0-2   Sq Epi: x / Non Sq Epi: x / Bacteria: Few    labs and imaging reviewed, care discussed with consultants , labs ordered for am. CHIEF COMPLAINT/INTERVAL HISTORY:    Patient is a 57y old  Female who presents with a chief complaint of Shortness of breath (2017 00:47)      HPI:  57 year old female with PMH of asthma, HIV, and cocaine abuse and PSH of appendectomy and  presents to ED complaining of shortness of breath which began on the . Patient admits to cough with sputum production and wheezing. Patient also admits to urinary frequency but denies dysuria. Patient states her last drug use was also on the  and that she usually spends about 20-40 dollars a day on crack/ cocaine. Patient also admits to smoking about 5 cigarets a day, but admits to smoking a pack per day in the past for about 30 years. Patient admits to drinking 2 wine coolers daily. (2017 00:47)      SUBJECTIVE & OBJECTIVE: Pt seen and examined at bedside.   c/o cough, sob, but feels better than before, gives h/o smoking crack fro many years, still actively uses cocaine, and drinks 1 cooler of wine daily, PTA  pt. states  asthma started at age of 44 years, was never asthmatic as a child or growing up,. never had any PFTs . Denies any h/o intubation, has nebulizer at home.  Denies chest pain, nausea/vomiting/diarrhea, No dizziness, HA or blurry vision, all other ROS negative.    Vital Signs Last 24 Hrs  T(C): 36.6, Max: 36.8 ( @ 17:38)  T(F): 97.8, Max: 98.2 ( @ 17:38)  HR: 92 (74 - 99)  BP: 114/71 (113/68 - 132/84)  BP(mean): --  RR: 18 (17 - 18)  SpO2: 95% (93% - 99%)      MEDICATIONS  (STANDING):  ALBUTerol/ipratropium for Nebulization 3milliLiter(s) Nebulizer every 6 hours  methylPREDNISolone sodium succinate Injectable 40milliGRAM(s) IV Push every 8 hours  heparin  Injectable 5000Unit(s) SubCutaneous every 12 hours  raltegravir Tablet 400milliGRAM(s) Oral two times a day  darunavir 800milliGRAM(s) Oral daily  famotidine    Tablet 20milliGRAM(s) Oral daily  ritonavir Tablet 100milliGRAM(s) Oral daily  dextrose 5% + sodium chloride 0.9%. 1000milliLiter(s) IV Continuous <Continuous>    MEDICATIONS  (PRN):  guaiFENesin    Syrup 200milliGRAM(s) Oral every 6 hours PRN Cough  LORazepam     Tablet 2milliGRAM(s) Oral every 2 hours PRN CIWA-Ar score increase by 2 points and a total score of 7 or less        PHYSICAL EXAM:    GENERAL: NAD, AAOx3, cachectic, ill looking  HEAD:  Atraumatic, Normocephalic  EYES: EOMI, PERRLA, conjunctiva and sclera clear  ENMT: Moist mucous membranes  NECK: Supple, No JVD  NERVOUS SYSTEM:  Alert & Oriented X3, Motor Strength 5/5 B/L upper and lower extremities;  CHEST/LUNG: generalized decreased BS, b/l wheezing +  HEART: Regular rate and rhythm;  ABDOMEN: Soft, Nontender, Nondistended; Bowel sounds present  EXTREMITIES:  No cyanosis, or edema    LABS:                        12.1   6.1   )-----------( 222      ( 2017 07:38 )             34.5     2017 07:38    146    |  107    |  16     ----------------------------<  146    4.4     |  32     |  0.79     Ca    8.6        2017 07:38    TPro  7.9    /  Alb  4.3    /  TBili  0.4    /  DBili  x      /  AST  48     /  ALT  58     /  AlkPhos  118    2017 14:37    PT/INR - ( 2017 18:32 )   PT: 10.7 sec;   INR: 0.96 ratio         PTT - ( 2017 18:32 )  PTT:26.8 sec  Urinalysis Basic - ( 2017 04:52 )    Color: Yellow / Appearance: Clear / S.020 / pH: x  Gluc: x / Ketone: Negative  / Bili: Negative / Urobili: Negative mg/dL   Blood: x / Protein: 30 mg/dL / Nitrite: Negative   Leuk Esterase: Negative / RBC: x / WBC 0-2   Sq Epi: x / Non Sq Epi: x / Bacteria: Few    labs and imaging reviewed, care discussed with consultants ,labs ordered for am

## 2017-01-27 DIAGNOSIS — D72.829 ELEVATED WHITE BLOOD CELL COUNT, UNSPECIFIED: ICD-10-CM

## 2017-01-27 LAB
ANION GAP SERPL CALC-SCNC: 6 MMOL/L — SIGNIFICANT CHANGE UP (ref 5–17)
BUN SERPL-MCNC: 21 MG/DL — SIGNIFICANT CHANGE UP (ref 7–23)
CALCIUM SERPL-MCNC: 8.2 MG/DL — LOW (ref 8.5–10.1)
CHLORIDE SERPL-SCNC: 109 MMOL/L — HIGH (ref 96–108)
CO2 SERPL-SCNC: 32 MMOL/L — HIGH (ref 22–31)
CREAT SERPL-MCNC: 0.97 MG/DL — SIGNIFICANT CHANGE UP (ref 0.5–1.3)
GLUCOSE SERPL-MCNC: 153 MG/DL — HIGH (ref 70–99)
HCT VFR BLD CALC: 34.1 % — LOW (ref 34.5–45)
HGB BLD-MCNC: 12.1 G/DL — SIGNIFICANT CHANGE UP (ref 11.5–15.5)
MCHC RBC-ENTMCNC: 31.2 PG — SIGNIFICANT CHANGE UP (ref 27–34)
MCHC RBC-ENTMCNC: 35.5 GM/DL — SIGNIFICANT CHANGE UP (ref 32–36)
MCV RBC AUTO: 88 FL — SIGNIFICANT CHANGE UP (ref 80–100)
PLATELET # BLD AUTO: 230 K/UL — SIGNIFICANT CHANGE UP (ref 150–400)
POTASSIUM SERPL-MCNC: 3.8 MMOL/L — SIGNIFICANT CHANGE UP (ref 3.5–5.3)
POTASSIUM SERPL-SCNC: 3.8 MMOL/L — SIGNIFICANT CHANGE UP (ref 3.5–5.3)
RBC # BLD: 3.87 M/UL — SIGNIFICANT CHANGE UP (ref 3.8–5.2)
RBC # FLD: 13.2 % — SIGNIFICANT CHANGE UP (ref 11–15)
SODIUM SERPL-SCNC: 147 MMOL/L — HIGH (ref 135–145)
WBC # BLD: 15.6 K/UL — HIGH (ref 3.8–10.5)
WBC # FLD AUTO: 15.6 K/UL — HIGH (ref 3.8–10.5)

## 2017-01-27 PROCEDURE — 99233 SBSQ HOSP IP/OBS HIGH 50: CPT

## 2017-01-27 RX ORDER — IPRATROPIUM/ALBUTEROL SULFATE 18-103MCG
3 AEROSOL WITH ADAPTER (GRAM) INHALATION EVERY 4 HOURS
Qty: 0 | Refills: 0 | Status: DISCONTINUED | OUTPATIENT
Start: 2017-01-27 | End: 2017-01-30

## 2017-01-27 RX ORDER — SODIUM CHLORIDE 9 MG/ML
1000 INJECTION, SOLUTION INTRAVENOUS
Qty: 0 | Refills: 0 | Status: DISCONTINUED | OUTPATIENT
Start: 2017-01-27 | End: 2017-02-01

## 2017-01-27 RX ADMIN — FAMOTIDINE 20 MILLIGRAM(S): 10 INJECTION INTRAVENOUS at 13:11

## 2017-01-27 RX ADMIN — Medication 200 MILLIGRAM(S): at 19:50

## 2017-01-27 RX ADMIN — SODIUM CHLORIDE 75 MILLILITER(S): 9 INJECTION, SOLUTION INTRAVENOUS at 18:03

## 2017-01-27 RX ADMIN — Medication 100 MILLIGRAM(S): at 13:13

## 2017-01-27 RX ADMIN — Medication 3 MILLILITER(S): at 13:27

## 2017-01-27 RX ADMIN — Medication 40 MILLIGRAM(S): at 06:05

## 2017-01-27 RX ADMIN — Medication 3 MILLILITER(S): at 22:36

## 2017-01-27 RX ADMIN — Medication 3 MILLILITER(S): at 18:10

## 2017-01-27 RX ADMIN — RALTEGRAVIR 400 MILLIGRAM(S): 400 TABLET, FILM COATED ORAL at 06:24

## 2017-01-27 RX ADMIN — Medication 3 MILLILITER(S): at 05:10

## 2017-01-27 RX ADMIN — DARUNAVIR 800 MILLIGRAM(S): 75 TABLET, FILM COATED ORAL at 13:11

## 2017-01-27 RX ADMIN — Medication 1 MILLIGRAM(S): at 13:11

## 2017-01-27 RX ADMIN — Medication 20 MILLIGRAM(S): at 13:38

## 2017-01-27 RX ADMIN — BENZOCAINE AND MENTHOL 1 LOZENGE: 5; 1 LIQUID ORAL at 06:05

## 2017-01-27 RX ADMIN — Medication 200 MILLIGRAM(S): at 06:05

## 2017-01-27 RX ADMIN — HEPARIN SODIUM 5000 UNIT(S): 5000 INJECTION INTRAVENOUS; SUBCUTANEOUS at 06:05

## 2017-01-27 RX ADMIN — RALTEGRAVIR 400 MILLIGRAM(S): 400 TABLET, FILM COATED ORAL at 17:53

## 2017-01-27 RX ADMIN — BENZOCAINE AND MENTHOL 1 LOZENGE: 5; 1 LIQUID ORAL at 17:54

## 2017-01-27 RX ADMIN — HEPARIN SODIUM 5000 UNIT(S): 5000 INJECTION INTRAVENOUS; SUBCUTANEOUS at 17:53

## 2017-01-27 RX ADMIN — BENZOCAINE AND MENTHOL 1 LOZENGE: 5; 1 LIQUID ORAL at 23:05

## 2017-01-27 RX ADMIN — Medication 20 MILLIGRAM(S): at 23:01

## 2017-01-27 RX ADMIN — RITONAVIR 100 MILLIGRAM(S): 100 TABLET, FILM COATED ORAL at 13:13

## 2017-01-27 NOTE — PROGRESS NOTE ADULT - SUBJECTIVE AND OBJECTIVE BOX
INTERVAL HPI/OVERNIGHT EVENTS:  57 year old female with PMH of Asthma/COPD, HIV, and cocaine abuse and PSH of appendectomy and  presents to ED complaining of shortness of breath which began on the . Patient admits to cough with sputum production and wheezing. Patient also admits to urinary frequency but denies dysuria. Patient states her last drug use was also on the  and that she usually spends about 20-40 dollars a day on crack/ cocaine. Patient also admits to smoking about 5 cigarets a day, but admits to smoking a pack per day in the past for about 30 years. Patient admits to drinking 2 wine coolers daily.  Denies high fever, chills or chest pain. Also denies catching a cold.  Comfortable in bed. no distress.    Vital Signs Last 24 Hrs  T(C): 36.7, Max: 36.8 ( @ 18:09)  T(F): 98, Max: 98.2 ( @ 18:09)  HR: 98 (83 - 104)  BP: 118/72 (114/71 - 136/76)  BP(mean): --  RR: 20 (18 - 20)  SpO2: 98% (94% - 99%)    PHYSICAL EXAM:  GEN:         Awake, responsive and comfortable.  HEENT:    Normal.    RESP:      no wheezing  CVS:          Regular rate and rhythm.   ABD:         Soft, non-tender, non-distended;   :             No costovertebral angle tenderness  EXTR:            No clubbing, cyanosis or edema  CNS:              Intact sensory and motor function.    MEDICATIONS  (STANDING):  methylPREDNISolone sodium succinate Injectable 40milliGRAM(s) IV Push every 8 hours  heparin  Injectable 5000Unit(s) SubCutaneous every 12 hours  raltegravir Tablet 400milliGRAM(s) Oral two times a day  darunavir 800milliGRAM(s) Oral daily  famotidine    Tablet 20milliGRAM(s) Oral daily  ritonavir Tablet 100milliGRAM(s) Oral daily  dextrose 5% + sodium chloride 0.9%. 1000milliLiter(s) IV Continuous <Continuous>  thiamine 100milliGRAM(s) Oral daily  folic acid 1milliGRAM(s) Oral daily  ALBUTerol/ipratropium for Nebulization 3milliLiter(s) Nebulizer every 4 hours  levoFLOXacin IVPB  IV Intermittent   levoFLOXacin IVPB 500milliGRAM(s) IV Intermittent once    MEDICATIONS  (PRN):  guaiFENesin    Syrup 200milliGRAM(s) Oral every 6 hours PRN Cough  LORazepam     Tablet 2milliGRAM(s) Oral every 2 hours PRN CIWA-Ar score increase by 2 points and a total score of 7 or less  benzocaine 15 mG/menthol 3.6 mG Lozenge 1Lozenge Oral four times a day PRN Sore Throat    LABS:                        12.1   15.6  )-----------( 230      ( 2017 06:10 )             34.1     2017 06:10    147    |  109    |  21     ----------------------------<  153    3.8     |  32     |  0.97   nebulizer and antibiotics.  Ca    8.2        2017 06:10    ASSESSMENT AND PLAN:  ·	SOB with COUGH.  ·	Acute COPD exacerbation.  ·	Corona virus tracheobronchitis  ·	Cocaine abuse.  ·	Alcohol abuse.  ·	Tobacco abuse.  ·	HIV.    reduce steroids.  continue

## 2017-01-27 NOTE — DIETITIAN INITIAL EVALUATION ADULT. - OTHER INFO
Pt seen for consult - FTT + BMI < 19. Pt lives c son; daughter lives nearby; sometimes shops & cooks for herself; performs ADL independently; intake depends on current substance abuse status. RD observed breakfast tray; 100% consumed & pt drinking Ensure; agreed to 3 Ensures/day; requested PBJ for evening snack; reports eating well since hospitalization.  Denies any N//V/C/D or chew/swallowing difficulty.; reports BM as regular; last x 1 (1/27).

## 2017-01-27 NOTE — DIETITIAN INITIAL EVALUATION ADULT. - PHYSICAL APPEARANCE
emaciated/BMI = 14.5; no edema noted; physical signs of malnutrition present: severe temporal & clavicle muscle wasting; moderate orbital & buccal fat depletion/underweight

## 2017-01-27 NOTE — DIETITIAN INITIAL EVALUATION ADULT. - NUTRITION INTERVENTION
Meals and Snack/Medical Food Supplements Medical Food Supplements/Nutrition Education/Meals and Snack

## 2017-01-27 NOTE — DIETITIAN INITIAL EVALUATION ADULT. - ENERGY NEEDS
Height (cm): 165.1 (01-25)  Weight (kg): 39.5 (01-25)  BMI (kg/m2): 14.5 (01-25)  IBW:  56.8 kf +/- 10%        % IBW:  70%          UBW:   50 kg (1 yr ago)     %UBW: 79%

## 2017-01-27 NOTE — PROGRESS NOTE ADULT - ASSESSMENT
58 y/o F with h/o cocaine/alcohol abuse, p/w sob and cough, likely copd exacerbation, h/o smoking crack, but no h/o asthma in childhood , as per pt. it all started after the age of 44 , also with hiv , on haart, h/o cocaine and etoh abuse.

## 2017-01-27 NOTE — PROGRESS NOTE ADULT - SUBJECTIVE AND OBJECTIVE BOX
CHIEF COMPLAINT/INTERVAL HISTORY:    Patient is a 57y old  Female who presents with a chief complaint of Shortness of breath (2017 00:47)      HPI:  57 year old female with PMH of asthma, HIV, and cocaine abuse and PSH of appendectomy and  presents to ED complaining of shortness of breath which began on the . Patient admits to cough with sputum production and wheezing. Patient also admits to urinary frequency but denies dysuria. Patient states her last drug use was also on the  and that she usually spends about 20-40 dollars a day on crack/ cocaine. Patient also admits to smoking about 5 cigarets a day, but admits to smoking a pack per day in the past for about 30 years. Patient admits to drinking 2 wine coolers daily. (2017 00:47)      SUBJECTIVE & OBJECTIVE: Pt seen and examined at bedside.     ICU Vital Signs Last 24 Hrs  T(C): 36.7, Max: 36.8 ( @ 18:09)  T(F): 98, Max: 98.2 ( @ 18:09)  HR: 88 (80 - 104)  BP: 154/83 (114/71 - 154/83)  BP(mean): --  ABP: --  ABP(mean): --  RR: 19 (18 - 20)  SpO2: 97% (0% - 99%)        MEDICATIONS  (STANDING):  heparin  Injectable 5000Unit(s) SubCutaneous every 12 hours  raltegravir Tablet 400milliGRAM(s) Oral two times a day  darunavir 800milliGRAM(s) Oral daily  famotidine    Tablet 20milliGRAM(s) Oral daily  ritonavir Tablet 100milliGRAM(s) Oral daily  dextrose 5% + sodium chloride 0.9%. 1000milliLiter(s) IV Continuous <Continuous>  thiamine 100milliGRAM(s) Oral daily  folic acid 1milliGRAM(s) Oral daily  ALBUTerol/ipratropium for Nebulization 3milliLiter(s) Nebulizer every 4 hours  levoFLOXacin IVPB  IV Intermittent   methylPREDNISolone sodium succinate Injectable 20milliGRAM(s) IV Push every 8 hours    MEDICATIONS  (PRN):  guaiFENesin    Syrup 200milliGRAM(s) Oral every 6 hours PRN Cough  LORazepam     Tablet 2milliGRAM(s) Oral every 2 hours PRN CIWA-Ar score increase by 2 points and a total score of 7 or less  benzocaine 15 mG/menthol 3.6 mG Lozenge 1Lozenge Oral four times a day PRN Sore Throat        PHYSICAL EXAM:    GENERAL: NAD, well-groomed, well-developed  HEAD:  Atraumatic, Normocephalic  EYES: EOMI, PERRLA, conjunctiva and sclera clear  ENMT: Moist mucous membranes  NECK: Supple, No JVD  NERVOUS SYSTEM:  Alert & Oriented X3, Motor Strength 5/5 B/L upper and lower extremities; DTRs 2+ intact and symmetric  CHEST/LUNG: Clear to auscultation bilaterally; No rales, rhonchi, wheezing, or rubs  HEART: Regular rate and rhythm; No murmurs, rubs, or gallops  ABDOMEN: Soft, Nontender, Nondistended; Bowel sounds present  EXTREMITIES:  2+ Peripheral Pulses, No clubbing, cyanosis, or edema    LABS:                        12.1   15.6  )-----------( 230      ( 2017 06:10 )             34.1     2017 06:10    147    |  109    |  21     ----------------------------<  153    3.8     |  32     |  0.97     Ca    8.2        2017 06:10            CAPILLARY BLOOD GLUCOSE      RECENT CULTURES:      RADIOLOGY & ADDITIONAL TESTS:    Health Issues:  STATUS ASTHMATICUS/ COCAINE ABUSE  ASTHMA  Status asthmaticus  Hypernatremia  Severe protein-calorie malnutrition  Alcohol abuse  Tracheobronchitis  COPD (chronic obstructive pulmonary disease) with acute bronchitis  Asthma with status asthmaticus, unspecified asthma severity  Moderate persistent asthma with status asthmaticus  Prophylactic measure  Cocaine abuse  HIV (human immunodeficiency virus infection)  Status asthmaticus      DVT/GI ppx  Discussed with pt @ bedside CHIEF COMPLAINT/INTERVAL HISTORY:    Patient is a 57y old  Female who presents with a chief complaint of Shortness of breath (2017 00:47)      HPI:  57 year old female with PMH of asthma, HIV, and cocaine abuse and PSH of appendectomy and  presents to ED complaining of shortness of breath which began on the . Patient admits to cough with sputum production and wheezing. Patient also admits to urinary frequency but denies dysuria. Patient states her last drug use was also on the  and that she usually spends about 20-40 dollars a day on crack/ cocaine. Patient also admits to smoking about 5 cigarets a day, but admits to smoking a pack per day in the past for about 30 years. Patient admits to drinking 2 wine coolers daily. (2017 00:47)      SUBJECTIVE & OBJECTIVE: Pt seen and examined at bedside. c/o cough , SOB mild improvement,   Denies chest pain, nausea/vomiting/diarrhea, abd. pain, dizziness, HA or blurry vision, all other ROS negative.  No overnight events  CIWA zero      Vital Signs Last 24 Hrs  T(C): 36.7, Max: 36.8 ( @ 18:09)  T(F): 98, Max: 98.2 ( @ 18:09)  HR: 88 (80 - 104)  BP: 154/83 (114/71 - 154/83)  BP(mean): --  ABP: --  ABP(mean): --  RR: 19 (18 - 20)  SpO2: 97% (0% - 99%)        MEDICATIONS  (STANDING):  heparin  Injectable 5000Unit(s) SubCutaneous every 12 hours  raltegravir Tablet 400milliGRAM(s) Oral two times a day  darunavir 800milliGRAM(s) Oral daily  famotidine    Tablet 20milliGRAM(s) Oral daily  ritonavir Tablet 100milliGRAM(s) Oral daily  dextrose 5% + sodium chloride 0.9%. 1000milliLiter(s) IV Continuous <Continuous>  thiamine 100milliGRAM(s) Oral daily  folic acid 1milliGRAM(s) Oral daily  ALBUTerol/ipratropium for Nebulization 3milliLiter(s) Nebulizer every 4 hours  levoFLOXacin IVPB  IV Intermittent   methylPREDNISolone sodium succinate Injectable 20milliGRAM(s) IV Push every 8 hours    MEDICATIONS  (PRN):  guaiFENesin    Syrup 200milliGRAM(s) Oral every 6 hours PRN Cough  LORazepam     Tablet 2milliGRAM(s) Oral every 2 hours PRN CIWA-Ar score increase by 2 points and a total score of 7 or less  benzocaine 15 mG/menthol 3.6 mG Lozenge 1Lozenge Oral four times a day PRN Sore Throat        PHYSICAL EXAM:    GENERAL: NAD, AAOx3, cachectic, ill looking  HEAD:  Atraumatic, Normocephalic  EYES: EOMI, PERRLA, conjunctiva and sclera clear  ENMT: Moist mucous membranes  NECK: Supple, No JVD  NERVOUS SYSTEM:  Alert & Oriented X3, Motor Strength 5/5 B/L upper and lower extremities;  CHEST/LUNG: generalized decreased BS, b/l wheezing +  HEART: Regular rate and rhythm;  ABDOMEN: Soft, Nontender, Nondistended; Bowel sounds present  EXTREMITIES:  No cyanosis, or edema      LABS:                        12.1   15.6  )-----------( 230      ( 2017 06:10 )             34.1     2017 06:10    147    |  109    |  21     ----------------------------<  153    3.8     |  32     |  0.97     Ca    8.2        2017 06:10      labs and imaging reviewed, care discussed with consultants , labs ordered for am.

## 2017-01-27 NOTE — DIETITIAN INITIAL EVALUATION ADULT. - PERTINENT LABORATORY DATA
01-27 Na147 mmol/L<H> Glu 153 mg/dL<H> K+ 3.8 mmol/L Cr  0.97 mg/dL BUN 21 mg/dL Phos n/a   Alb n/a   PAB n/a

## 2017-01-28 LAB
ANION GAP SERPL CALC-SCNC: 4 MMOL/L — LOW (ref 5–17)
BUN SERPL-MCNC: 19 MG/DL — SIGNIFICANT CHANGE UP (ref 7–23)
CALCIUM SERPL-MCNC: 7.8 MG/DL — LOW (ref 8.5–10.1)
CHLORIDE SERPL-SCNC: 108 MMOL/L — SIGNIFICANT CHANGE UP (ref 96–108)
CO2 SERPL-SCNC: 32 MMOL/L — HIGH (ref 22–31)
CREAT SERPL-MCNC: 0.84 MG/DL — SIGNIFICANT CHANGE UP (ref 0.5–1.3)
GLUCOSE SERPL-MCNC: 143 MG/DL — HIGH (ref 70–99)
HCT VFR BLD CALC: 34.6 % — SIGNIFICANT CHANGE UP (ref 34.5–45)
HGB BLD-MCNC: 11.6 G/DL — SIGNIFICANT CHANGE UP (ref 11.5–15.5)
MCHC RBC-ENTMCNC: 30.3 PG — SIGNIFICANT CHANGE UP (ref 27–34)
MCHC RBC-ENTMCNC: 33.7 GM/DL — SIGNIFICANT CHANGE UP (ref 32–36)
MCV RBC AUTO: 89.7 FL — SIGNIFICANT CHANGE UP (ref 80–100)
PLATELET # BLD AUTO: 210 K/UL — SIGNIFICANT CHANGE UP (ref 150–400)
POTASSIUM SERPL-MCNC: 4 MMOL/L — SIGNIFICANT CHANGE UP (ref 3.5–5.3)
POTASSIUM SERPL-SCNC: 4 MMOL/L — SIGNIFICANT CHANGE UP (ref 3.5–5.3)
RBC # BLD: 3.85 M/UL — SIGNIFICANT CHANGE UP (ref 3.8–5.2)
RBC # FLD: 13.4 % — SIGNIFICANT CHANGE UP (ref 11–15)
SODIUM SERPL-SCNC: 144 MMOL/L — SIGNIFICANT CHANGE UP (ref 135–145)
WBC # BLD: 12.4 K/UL — HIGH (ref 3.8–10.5)
WBC # FLD AUTO: 12.4 K/UL — HIGH (ref 3.8–10.5)

## 2017-01-28 PROCEDURE — 99232 SBSQ HOSP IP/OBS MODERATE 35: CPT

## 2017-01-28 RX ORDER — ACETAMINOPHEN 500 MG
650 TABLET ORAL ONCE
Qty: 0 | Refills: 0 | Status: COMPLETED | OUTPATIENT
Start: 2017-01-28 | End: 2017-01-28

## 2017-01-28 RX ADMIN — HEPARIN SODIUM 5000 UNIT(S): 5000 INJECTION INTRAVENOUS; SUBCUTANEOUS at 17:33

## 2017-01-28 RX ADMIN — Medication 3 MILLILITER(S): at 10:35

## 2017-01-28 RX ADMIN — Medication 100 MILLIGRAM(S): at 11:25

## 2017-01-28 RX ADMIN — Medication 3 MILLILITER(S): at 17:21

## 2017-01-28 RX ADMIN — Medication 650 MILLIGRAM(S): at 01:34

## 2017-01-28 RX ADMIN — Medication 200 MILLIGRAM(S): at 01:34

## 2017-01-28 RX ADMIN — SODIUM CHLORIDE 75 MILLILITER(S): 9 INJECTION, SOLUTION INTRAVENOUS at 13:34

## 2017-01-28 RX ADMIN — Medication 200 MILLIGRAM(S): at 11:25

## 2017-01-28 RX ADMIN — DARUNAVIR 800 MILLIGRAM(S): 75 TABLET, FILM COATED ORAL at 11:25

## 2017-01-28 RX ADMIN — Medication 3 MILLILITER(S): at 13:18

## 2017-01-28 RX ADMIN — Medication 20 MILLIGRAM(S): at 21:42

## 2017-01-28 RX ADMIN — Medication 3 MILLILITER(S): at 22:55

## 2017-01-28 RX ADMIN — BENZOCAINE AND MENTHOL 1 LOZENGE: 5; 1 LIQUID ORAL at 13:34

## 2017-01-28 RX ADMIN — RALTEGRAVIR 400 MILLIGRAM(S): 400 TABLET, FILM COATED ORAL at 06:28

## 2017-01-28 RX ADMIN — Medication 3 MILLILITER(S): at 01:12

## 2017-01-28 RX ADMIN — FAMOTIDINE 20 MILLIGRAM(S): 10 INJECTION INTRAVENOUS at 11:25

## 2017-01-28 RX ADMIN — Medication 650 MILLIGRAM(S): at 02:34

## 2017-01-28 RX ADMIN — Medication 20 MILLIGRAM(S): at 13:34

## 2017-01-28 RX ADMIN — Medication 3 MILLILITER(S): at 05:14

## 2017-01-28 RX ADMIN — Medication 20 MILLIGRAM(S): at 06:28

## 2017-01-28 RX ADMIN — Medication 1 MILLIGRAM(S): at 11:25

## 2017-01-28 RX ADMIN — SODIUM CHLORIDE 75 MILLILITER(S): 9 INJECTION, SOLUTION INTRAVENOUS at 06:31

## 2017-01-28 RX ADMIN — RITONAVIR 100 MILLIGRAM(S): 100 TABLET, FILM COATED ORAL at 11:25

## 2017-01-28 RX ADMIN — HEPARIN SODIUM 5000 UNIT(S): 5000 INJECTION INTRAVENOUS; SUBCUTANEOUS at 06:29

## 2017-01-28 RX ADMIN — RALTEGRAVIR 400 MILLIGRAM(S): 400 TABLET, FILM COATED ORAL at 18:26

## 2017-01-28 RX ADMIN — BENZOCAINE AND MENTHOL 1 LOZENGE: 5; 1 LIQUID ORAL at 21:43

## 2017-01-28 NOTE — PROGRESS NOTE ADULT - SUBJECTIVE AND OBJECTIVE BOX
INTERVAL HPI/OVERNIGHT EVENTS:  57 year old female with PMH of Asthma/COPD, HIV, and cocaine abuse and PSH of appendectomy and  presents to ED complaining of shortness of breath which began on the . Patient admits to cough with sputum production and wheezing. Patient also admits to urinary frequency but denies dysuria. Patient states her last drug use was also on the  and that she usually spends about 20-40 dollars a day on crack/ cocaine. Patient also admits to smoking about 5 cigarets a day, but admits to smoking a pack per day in the past for about 30 years. Patient admits to drinking 2 wine coolers daily.  Denies high fever, chills or chest pain. Also denies catching a cold.  Comfortable in bed. no distress.  Over all feels better.    Vital Signs Last 24 Hrs  T(C): 36.7, Max: 36.7 ( @ 12:02)  T(F): 98, Max: 98 ( @ 12:02)  HR: 110 (73 - 112)  BP: 150/62 (105/64 - 150/62)  BP(mean): --  RR: 19 (18 - 19)  SpO2: 100% (96% - 100%)    PHYSICAL EXAM:  GEN:         Awake, responsive and comfortable.  HEENT:    Normal.    RESP:     decreased air entry.  CVS:          Regular rate and rhythm.   ABD:         Soft, non-tender, non-distended;   :             No costovertebral angle tenderness  EXTR:            No clubbing, cyanosis or edema  CNS:              Intact sensory and motor function.    MEDICATIONS  (STANDING):  heparin  Injectable 5000Unit(s) SubCutaneous every 12 hours  raltegravir Tablet 400milliGRAM(s) Oral two times a day  darunavir 800milliGRAM(s) Oral daily  famotidine    Tablet 20milliGRAM(s) Oral daily  ritonavir Tablet 100milliGRAM(s) Oral daily  thiamine 100milliGRAM(s) Oral daily  folic acid 1milliGRAM(s) Oral daily  ALBUTerol/ipratropium for Nebulization 3milliLiter(s) Nebulizer every 4 hours  levoFLOXacin IVPB  IV Intermittent   levoFLOXacin IVPB 250milliGRAM(s) IV Intermittent every 24 hours  methylPREDNISolone sodium succinate Injectable 20milliGRAM(s) IV Push every 8 hours  dextrose 5% + sodium chloride 0.45%. 1000milliLiter(s) IV Continuous <Continuous>    MEDICATIONS  (PRN):  guaiFENesin    Syrup 200milliGRAM(s) Oral every 6 hours PRN Cough  LORazepam     Tablet 2milliGRAM(s) Oral every 2 hours PRN CIWA-Ar score increase by 2 points and a total score of 7 or less  benzocaine 15 mG/menthol 3.6 mG Lozenge 1Lozenge Oral four times a day PRN Sore Throat    LABS:                        11.6   12.4  )-----------( 210      ( 2017 08:52 )             34.6     2017 08:52    144    |  108    |  19     ----------------------------<  143    4.0     |  32     |  0.84     Ca    7.8        2017 08:52    ASSESSMENT AND PLAN:  ·	SOB with COUGH. improving.  ·	Acute COPD exacerbation.  ·	Corona virus tracheobronchitis  ·	Cocaine abuse.  ·	Alcohol abuse.  ·	Tobacco abuse.  ·	HIV.    Will change nebs to Q6 hours.  On antibiotics.

## 2017-01-28 NOTE — PROGRESS NOTE ADULT - SUBJECTIVE AND OBJECTIVE BOX
Patient is a 57y old  Female who presents with a chief complaint of Shortness of breath (25 Jan 2017 00:47)      INTERVAL HPI/OVERNIGHT EVENTS:  pt is still complaining shortnessof breath  . shortness continued     MEDICATIONS  (STANDING):  heparin  Injectable 5000Unit(s) SubCutaneous every 12 hours  raltegravir Tablet 400milliGRAM(s) Oral two times a day  darunavir 800milliGRAM(s) Oral daily  famotidine    Tablet 20milliGRAM(s) Oral daily  ritonavir Tablet 100milliGRAM(s) Oral daily  thiamine 100milliGRAM(s) Oral daily  folic acid 1milliGRAM(s) Oral daily  ALBUTerol/ipratropium for Nebulization 3milliLiter(s) Nebulizer every 4 hours  levoFLOXacin IVPB  IV Intermittent   levoFLOXacin IVPB 250milliGRAM(s) IV Intermittent every 24 hours  methylPREDNISolone sodium succinate Injectable 20milliGRAM(s) IV Push every 8 hours  dextrose 5% + sodium chloride 0.45%. 1000milliLiter(s) IV Continuous <Continuous>    MEDICATIONS  (PRN):  guaiFENesin    Syrup 200milliGRAM(s) Oral every 6 hours PRN Cough  LORazepam     Tablet 2milliGRAM(s) Oral every 2 hours PRN CIWA-Ar score increase by 2 points and a total score of 7 or less  benzocaine 15 mG/menthol 3.6 mG Lozenge 1Lozenge Oral four times a day PRN Sore Throat      Allergies    No Known Allergies    Intolerances        REVIEW OF SYSTEMS:  CONSTITUTIONAL: No fever, weight loss, or fatigue  EYES: No eye pain, visual disturbances, or discharge  ENMT:  No difficulty hearing, tinnitus, vertigo; No sinus or throat pain  NECK: No pain or stiffness  BREASTS: No pain, masses, or nipple discharge  RESPIRATORY: No cough, wheezing, chills or hemoptysis; No shortness of breath  CARDIOVASCULAR: No chest pain, palpitations, dizziness, or leg swelling  GASTROINTESTINAL: No abdominal or epigastric pain. No nausea, vomiting, or hematemesis; No diarrhea or constipation. No melena or hematochezia.  GENITOURINARY: No dysuria, frequency, hematuria, or incontinence  NEUROLOGICAL: No headaches, memory loss, loss of strength, numbness, or tremors  SKIN: No itching, burning, rashes, or lesions   LYMPH NODES: No enlarged glands  ENDOCRINE: No heat or cold intolerance; No hair loss  MUSCULOSKELETAL: No joint pain or swelling; No muscle, back, or extremity pain  PSYCHIATRIC: No depression, anxiety, mood swings, or difficulty sleeping  HEME/LYMPH: No easy bruising, or bleeding gums  ALLERGY AND IMMUNOLOGIC: No hives or eczema    Vital Signs Last 24 Hrs  T(C): 36.6, Max: 36.7 (01-27 @ 13:09)  T(F): 97.8, Max: 98 (01-27 @ 13:09)  HR: 78 (73 - 92)  BP: 105/64 (105/64 - 154/83)  BP(mean): --  RR: 19 (18 - 19)  SpO2: 100% (0% - 100%)    PHYSICAL EXAM:  GENERAL: NAD, well-groomed, well-developed  HEAD:  Atraumatic, Normocephalic  EYES: EOMI, PERRLA, conjunctiva and sclera clear  ENMT: No tonsillar erythema, exudates, or enlargement; Moist mucous membranes, Good dentition, No lesions  NECK: Supple, No JVD, Normal thyroid  NERVOUS SYSTEM:  Alert & Oriented X3, Good concentration; Motor Strength 5/5 B/L upper and lower extremities; DTRs 2+ intact and symmetric  CHEST/LUNG: Clear to percussion bilaterally; No rales, rhonchi, wheezing, or rubs  HEART: Regular rate and rhythm; No murmurs, rubs, or gallops  ABDOMEN: Soft, Nontender, Nondistended; Bowel sounds present  EXTREMITIES:  2+ Peripheral Pulses, No clubbing, cyanosis, or edema  LYMPH: No lymphadenopathy noted  SKIN: No rashes or lesions    LABS:                        11.6   12.4  )-----------( 210      ( 28 Jan 2017 08:52 )             34.6     28 Jan 2017 08:52    144    |  108    |  19     ----------------------------<  143    4.0     |  32     |  0.84     Ca    7.8        28 Jan 2017 08:52          CAPILLARY BLOOD GLUCOSE      RADIOLOGY & ADDITIONAL TESTS:    Imaging Personally Reviewed:  [ x] YES  [ ] NO    Consultant(s) Notes Reviewed:  [x ] YES  [ ] NO    Care Discussed with Consultants/Other Providers [ ] YES  [ ] NO

## 2017-01-29 LAB
ALBUMIN SERPL ELPH-MCNC: 3.2 G/DL — LOW (ref 3.3–5)
ALP SERPL-CCNC: 96 U/L — SIGNIFICANT CHANGE UP (ref 40–120)
ALT FLD-CCNC: 38 U/L — SIGNIFICANT CHANGE UP (ref 12–78)
ANION GAP SERPL CALC-SCNC: 5 MMOL/L — SIGNIFICANT CHANGE UP (ref 5–17)
AST SERPL-CCNC: 16 U/L — SIGNIFICANT CHANGE UP (ref 15–37)
BILIRUB SERPL-MCNC: 0.2 MG/DL — SIGNIFICANT CHANGE UP (ref 0.2–1.2)
BUN SERPL-MCNC: 23 MG/DL — SIGNIFICANT CHANGE UP (ref 7–23)
CALCIUM SERPL-MCNC: 8.2 MG/DL — LOW (ref 8.5–10.1)
CHLORIDE SERPL-SCNC: 106 MMOL/L — SIGNIFICANT CHANGE UP (ref 96–108)
CO2 SERPL-SCNC: 34 MMOL/L — HIGH (ref 22–31)
CREAT SERPL-MCNC: 0.86 MG/DL — SIGNIFICANT CHANGE UP (ref 0.5–1.3)
GLUCOSE SERPL-MCNC: 140 MG/DL — HIGH (ref 70–99)
HCT VFR BLD CALC: 35.1 % — SIGNIFICANT CHANGE UP (ref 34.5–45)
HGB BLD-MCNC: 12.4 G/DL — SIGNIFICANT CHANGE UP (ref 11.5–15.5)
MCHC RBC-ENTMCNC: 30.8 PG — SIGNIFICANT CHANGE UP (ref 27–34)
MCHC RBC-ENTMCNC: 35.4 GM/DL — SIGNIFICANT CHANGE UP (ref 32–36)
MCV RBC AUTO: 87 FL — SIGNIFICANT CHANGE UP (ref 80–100)
PLATELET # BLD AUTO: 233 K/UL — SIGNIFICANT CHANGE UP (ref 150–400)
POTASSIUM SERPL-MCNC: 4.3 MMOL/L — SIGNIFICANT CHANGE UP (ref 3.5–5.3)
POTASSIUM SERPL-SCNC: 4.3 MMOL/L — SIGNIFICANT CHANGE UP (ref 3.5–5.3)
PROT SERPL-MCNC: 6.3 GM/DL — SIGNIFICANT CHANGE UP (ref 6–8.3)
RBC # BLD: 4.04 M/UL — SIGNIFICANT CHANGE UP (ref 3.8–5.2)
RBC # FLD: 12.9 % — SIGNIFICANT CHANGE UP (ref 11–15)
SODIUM SERPL-SCNC: 145 MMOL/L — SIGNIFICANT CHANGE UP (ref 135–145)
WBC # BLD: 11.8 K/UL — HIGH (ref 3.8–10.5)
WBC # FLD AUTO: 11.8 K/UL — HIGH (ref 3.8–10.5)

## 2017-01-29 PROCEDURE — 99232 SBSQ HOSP IP/OBS MODERATE 35: CPT

## 2017-01-29 RX ORDER — ACETAMINOPHEN 500 MG
650 TABLET ORAL ONCE
Qty: 0 | Refills: 0 | Status: COMPLETED | OUTPATIENT
Start: 2017-01-29 | End: 2017-01-29

## 2017-01-29 RX ADMIN — Medication 3 MILLILITER(S): at 09:28

## 2017-01-29 RX ADMIN — BENZOCAINE AND MENTHOL 1 LOZENGE: 5; 1 LIQUID ORAL at 22:46

## 2017-01-29 RX ADMIN — RALTEGRAVIR 400 MILLIGRAM(S): 400 TABLET, FILM COATED ORAL at 05:37

## 2017-01-29 RX ADMIN — Medication 20 MILLIGRAM(S): at 13:05

## 2017-01-29 RX ADMIN — Medication 100 MILLIGRAM(S): at 12:33

## 2017-01-29 RX ADMIN — RITONAVIR 100 MILLIGRAM(S): 100 TABLET, FILM COATED ORAL at 11:18

## 2017-01-29 RX ADMIN — Medication 3 MILLILITER(S): at 01:56

## 2017-01-29 RX ADMIN — BENZOCAINE AND MENTHOL 1 LOZENGE: 5; 1 LIQUID ORAL at 08:43

## 2017-01-29 RX ADMIN — FAMOTIDINE 20 MILLIGRAM(S): 10 INJECTION INTRAVENOUS at 11:18

## 2017-01-29 RX ADMIN — Medication 200 MILLIGRAM(S): at 01:19

## 2017-01-29 RX ADMIN — HEPARIN SODIUM 5000 UNIT(S): 5000 INJECTION INTRAVENOUS; SUBCUTANEOUS at 17:40

## 2017-01-29 RX ADMIN — Medication 20 MILLIGRAM(S): at 05:36

## 2017-01-29 RX ADMIN — Medication 3 MILLILITER(S): at 17:16

## 2017-01-29 RX ADMIN — Medication 1 TABLET(S): at 11:18

## 2017-01-29 RX ADMIN — Medication 20 MILLIGRAM(S): at 21:45

## 2017-01-29 RX ADMIN — Medication 3 MILLILITER(S): at 13:16

## 2017-01-29 RX ADMIN — Medication 1 MILLIGRAM(S): at 11:18

## 2017-01-29 RX ADMIN — DARUNAVIR 800 MILLIGRAM(S): 75 TABLET, FILM COATED ORAL at 11:18

## 2017-01-29 RX ADMIN — Medication 100 MILLIGRAM(S): at 21:44

## 2017-01-29 RX ADMIN — RALTEGRAVIR 400 MILLIGRAM(S): 400 TABLET, FILM COATED ORAL at 17:40

## 2017-01-29 RX ADMIN — SODIUM CHLORIDE 75 MILLILITER(S): 9 INJECTION, SOLUTION INTRAVENOUS at 05:37

## 2017-01-29 RX ADMIN — HEPARIN SODIUM 5000 UNIT(S): 5000 INJECTION INTRAVENOUS; SUBCUTANEOUS at 05:38

## 2017-01-29 RX ADMIN — Medication 650 MILLIGRAM(S): at 02:19

## 2017-01-29 RX ADMIN — Medication 650 MILLIGRAM(S): at 01:19

## 2017-01-29 RX ADMIN — Medication 3 MILLILITER(S): at 22:18

## 2017-01-29 RX ADMIN — Medication 100 MILLIGRAM(S): at 11:18

## 2017-01-29 NOTE — PROGRESS NOTE ADULT - SUBJECTIVE AND OBJECTIVE BOX
Patient is a 57y old  Female who presents with a chief complaint of Shortness of breath (25 Jan 2017 00:47)      INTERVAL HPI/OVERNIGHT EVENTS:  pt is very shortness of breath and cough .    MEDICATIONS  (STANDING):  heparin  Injectable 5000Unit(s) SubCutaneous every 12 hours  raltegravir Tablet 400milliGRAM(s) Oral two times a day  darunavir 800milliGRAM(s) Oral daily  famotidine    Tablet 20milliGRAM(s) Oral daily  ritonavir Tablet 100milliGRAM(s) Oral daily  thiamine 100milliGRAM(s) Oral daily  folic acid 1milliGRAM(s) Oral daily  ALBUTerol/ipratropium for Nebulization 3milliLiter(s) Nebulizer every 4 hours  levoFLOXacin IVPB  IV Intermittent   levoFLOXacin IVPB 250milliGRAM(s) IV Intermittent every 24 hours  methylPREDNISolone sodium succinate Injectable 20milliGRAM(s) IV Push every 8 hours  dextrose 5% + sodium chloride 0.45%. 1000milliLiter(s) IV Continuous <Continuous>  multivitamin 1Tablet(s) Oral once    MEDICATIONS  (PRN):  guaiFENesin    Syrup 200milliGRAM(s) Oral every 6 hours PRN Cough  LORazepam     Tablet 2milliGRAM(s) Oral every 2 hours PRN CIWA-Ar score increase by 2 points and a total score of 7 or less  benzocaine 15 mG/menthol 3.6 mG Lozenge 1Lozenge Oral four times a day PRN Sore Throat      Allergies    No Known Allergies    Intolerances        REVIEW OF SYSTEMS:  CONSTITUTIONAL: No fever, weight loss, or fatigue  EYES: No eye pain, visual disturbances, or discharge  ENMT:  No difficulty hearing, tinnitus, vertigo; No sinus or throat pain  NECK: No pain or stiffness  BREASTS: No pain, masses, or nipple discharge  RESPIRATORY: No cough, wheezing, chills or hemoptysis; No shortness of breath  CARDIOVASCULAR: No chest pain, palpitations, dizziness, or leg swelling  GASTROINTESTINAL: No abdominal or epigastric pain. No nausea, vomiting, or hematemesis; No diarrhea or constipation. No melena or hematochezia.  GENITOURINARY: No dysuria, frequency, hematuria, or incontinence  NEUROLOGICAL: No headaches, memory loss, loss of strength, numbness, or tremors  SKIN: No itching, burning, rashes, or lesions   LYMPH NODES: No enlarged glands  ENDOCRINE: No heat or cold intolerance; No hair loss  MUSCULOSKELETAL: No joint pain or swelling; No muscle, back, or extremity pain  PSYCHIATRIC: No depression, anxiety, mood swings, or difficulty sleeping  HEME/LYMPH: No easy bruising, or bleeding gums  ALLERGY AND IMMUNOLOGIC: No hives or eczema    Vital Signs Last 24 Hrs  T(C): 36.7, Max: 36.7 (01-28 @ 12:02)  T(F): 98, Max: 98 (01-28 @ 12:02)  HR: 96 (77 - 112)  BP: 132/79 (130/90 - 150/62)  BP(mean): --  RR: 18 (18 - 19)  SpO2: 96% (96% - 100%)    PHYSICAL EXAM:  GENERAL: NAD, well-groomed, well-developed  HEAD:  Atraumatic, Normocephalic  EYES: EOMI, PERRLA, conjunctiva and sclera clear  ENMT: No tonsillar erythema, exudates, or enlargement; Moist mucous membranes, Good dentition, No lesions  NECK: Supple, No JVD, Normal thyroid  NERVOUS SYSTEM:  Alert & Oriented X3, Good concentration; Motor Strength 5/5 B/L upper and lower extremities; DTRs 2+ intact and symmetric  CHEST/LUNG: Clear to percussion bilaterally; No rales, rhonchi, wheezing, or rubs  HEART: Regular rate and rhythm; No murmurs, rubs, or gallops  ABDOMEN: Soft, Nontender, Nondistended; Bowel sounds present  EXTREMITIES:  2+ Peripheral Pulses, No clubbing, cyanosis, or edema  LYMPH: No lymphadenopathy noted  SKIN: No rashes or lesions    LABS:                        12.4   11.8  )-----------( 233      ( 29 Jan 2017 07:10 )             35.1     29 Jan 2017 07:10    145    |  106    |  23     ----------------------------<  140    4.3     |  34     |  0.86     Ca    8.2        29 Jan 2017 07:10    TPro  6.3    /  Alb  3.2    /  TBili  0.2    /  DBili  x      /  AST  16     /  ALT  38     /  AlkPhos  96     29 Jan 2017 07:10        CAPILLARY BLOOD GLUCOSE      RADIOLOGY & ADDITIONAL TESTS:    Imaging Personally Reviewed:  [x ] YES  [ ] NO    Consultant(s) Notes Reviewed:  [x ] YES  [ ] NO    Care Discussed with Consultants/Other Providers [ ] YES  [ ] NO

## 2017-01-29 NOTE — PROGRESS NOTE ADULT - ASSESSMENT
p still have.t is known to have  severe emphysema and  hiv and cocaine abusur. complaining shortness of breath and coughpt is on levaquin still have shortness of breath .

## 2017-01-30 DIAGNOSIS — J45.901 UNSPECIFIED ASTHMA WITH (ACUTE) EXACERBATION: ICD-10-CM

## 2017-01-30 LAB
24R-OH-CALCIDIOL SERPL-MCNC: 32.2 NG/ML — SIGNIFICANT CHANGE UP (ref 30–100)
ALBUMIN SERPL ELPH-MCNC: 3.1 G/DL — LOW (ref 3.3–5)
ALP SERPL-CCNC: 107 U/L — SIGNIFICANT CHANGE UP (ref 40–120)
ALT FLD-CCNC: 42 U/L — SIGNIFICANT CHANGE UP (ref 12–78)
ANION GAP SERPL CALC-SCNC: 6 MMOL/L — SIGNIFICANT CHANGE UP (ref 5–17)
AST SERPL-CCNC: 20 U/L — SIGNIFICANT CHANGE UP (ref 15–37)
BILIRUB SERPL-MCNC: 0.2 MG/DL — SIGNIFICANT CHANGE UP (ref 0.2–1.2)
BUN SERPL-MCNC: 25 MG/DL — HIGH (ref 7–23)
CALCIUM SERPL-MCNC: 8 MG/DL — LOW (ref 8.5–10.1)
CHLORIDE SERPL-SCNC: 105 MMOL/L — SIGNIFICANT CHANGE UP (ref 96–108)
CO2 SERPL-SCNC: 35 MMOL/L — HIGH (ref 22–31)
CREAT SERPL-MCNC: 0.84 MG/DL — SIGNIFICANT CHANGE UP (ref 0.5–1.3)
CULTURE RESULTS: SIGNIFICANT CHANGE UP
CULTURE RESULTS: SIGNIFICANT CHANGE UP
GLUCOSE SERPL-MCNC: 141 MG/DL — HIGH (ref 70–99)
HCT VFR BLD CALC: 34.3 % — LOW (ref 34.5–45)
HGB BLD-MCNC: 11.6 G/DL — SIGNIFICANT CHANGE UP (ref 11.5–15.5)
MCHC RBC-ENTMCNC: 30.6 PG — SIGNIFICANT CHANGE UP (ref 27–34)
MCHC RBC-ENTMCNC: 33.8 GM/DL — SIGNIFICANT CHANGE UP (ref 32–36)
MCV RBC AUTO: 90.5 FL — SIGNIFICANT CHANGE UP (ref 80–100)
PLATELET # BLD AUTO: 216 K/UL — SIGNIFICANT CHANGE UP (ref 150–400)
POTASSIUM SERPL-MCNC: 4.3 MMOL/L — SIGNIFICANT CHANGE UP (ref 3.5–5.3)
POTASSIUM SERPL-SCNC: 4.3 MMOL/L — SIGNIFICANT CHANGE UP (ref 3.5–5.3)
PROCALCITONIN SERPL-MCNC: 0.05 NG/ML — SIGNIFICANT CHANGE UP (ref 0–0.05)
PROT SERPL-MCNC: 6 GM/DL — SIGNIFICANT CHANGE UP (ref 6–8.3)
RBC # BLD: 3.8 M/UL — SIGNIFICANT CHANGE UP (ref 3.8–5.2)
RBC # FLD: 13.5 % — SIGNIFICANT CHANGE UP (ref 11–15)
SODIUM SERPL-SCNC: 146 MMOL/L — HIGH (ref 135–145)
SPECIMEN SOURCE: SIGNIFICANT CHANGE UP
SPECIMEN SOURCE: SIGNIFICANT CHANGE UP
WBC # BLD: 12.5 K/UL — HIGH (ref 3.8–10.5)
WBC # FLD AUTO: 12.5 K/UL — HIGH (ref 3.8–10.5)

## 2017-01-30 PROCEDURE — 99233 SBSQ HOSP IP/OBS HIGH 50: CPT

## 2017-01-30 PROCEDURE — 71010: CPT | Mod: 26

## 2017-01-30 PROCEDURE — 99232 SBSQ HOSP IP/OBS MODERATE 35: CPT

## 2017-01-30 RX ORDER — IPRATROPIUM/ALBUTEROL SULFATE 18-103MCG
3 AEROSOL WITH ADAPTER (GRAM) INHALATION EVERY 6 HOURS
Qty: 0 | Refills: 0 | Status: DISCONTINUED | OUTPATIENT
Start: 2017-01-30 | End: 2017-02-01

## 2017-01-30 RX ORDER — ACETYLCYSTEINE 200 MG/ML
2 VIAL (ML) MISCELLANEOUS EVERY 6 HOURS
Qty: 0 | Refills: 0 | Status: DISCONTINUED | OUTPATIENT
Start: 2017-01-30 | End: 2017-02-01

## 2017-01-30 RX ADMIN — FAMOTIDINE 20 MILLIGRAM(S): 10 INJECTION INTRAVENOUS at 11:11

## 2017-01-30 RX ADMIN — Medication 100 MILLIGRAM(S): at 11:11

## 2017-01-30 RX ADMIN — Medication 20 MILLIGRAM(S): at 22:36

## 2017-01-30 RX ADMIN — RITONAVIR 100 MILLIGRAM(S): 100 TABLET, FILM COATED ORAL at 11:10

## 2017-01-30 RX ADMIN — Medication 3 MILLILITER(S): at 13:47

## 2017-01-30 RX ADMIN — HEPARIN SODIUM 5000 UNIT(S): 5000 INJECTION INTRAVENOUS; SUBCUTANEOUS at 18:49

## 2017-01-30 RX ADMIN — Medication 3 MILLILITER(S): at 09:35

## 2017-01-30 RX ADMIN — SODIUM CHLORIDE 75 MILLILITER(S): 9 INJECTION, SOLUTION INTRAVENOUS at 18:55

## 2017-01-30 RX ADMIN — Medication 2 MILLILITER(S): at 17:29

## 2017-01-30 RX ADMIN — Medication 1 MILLIGRAM(S): at 11:11

## 2017-01-30 RX ADMIN — HEPARIN SODIUM 5000 UNIT(S): 5000 INJECTION INTRAVENOUS; SUBCUTANEOUS at 06:04

## 2017-01-30 RX ADMIN — Medication 3 MILLILITER(S): at 05:09

## 2017-01-30 RX ADMIN — BENZOCAINE AND MENTHOL 1 LOZENGE: 5; 1 LIQUID ORAL at 06:04

## 2017-01-30 RX ADMIN — BENZOCAINE AND MENTHOL 1 LOZENGE: 5; 1 LIQUID ORAL at 11:24

## 2017-01-30 RX ADMIN — RALTEGRAVIR 400 MILLIGRAM(S): 400 TABLET, FILM COATED ORAL at 18:49

## 2017-01-30 RX ADMIN — DARUNAVIR 800 MILLIGRAM(S): 75 TABLET, FILM COATED ORAL at 11:25

## 2017-01-30 RX ADMIN — Medication 100 MILLIGRAM(S): at 11:28

## 2017-01-30 RX ADMIN — Medication 100 MILLIGRAM(S): at 06:04

## 2017-01-30 RX ADMIN — RALTEGRAVIR 400 MILLIGRAM(S): 400 TABLET, FILM COATED ORAL at 06:03

## 2017-01-30 RX ADMIN — Medication 20 MILLIGRAM(S): at 14:59

## 2017-01-30 RX ADMIN — Medication 3 MILLILITER(S): at 01:37

## 2017-01-30 RX ADMIN — SODIUM CHLORIDE 75 MILLILITER(S): 9 INJECTION, SOLUTION INTRAVENOUS at 05:03

## 2017-01-30 RX ADMIN — Medication 3 MILLILITER(S): at 17:30

## 2017-01-30 RX ADMIN — Medication 20 MILLIGRAM(S): at 06:03

## 2017-01-30 RX ADMIN — BENZOCAINE AND MENTHOL 1 LOZENGE: 5; 1 LIQUID ORAL at 22:36

## 2017-01-30 NOTE — PROGRESS NOTE ADULT - ASSESSMENT
56 y/o F with h/o cocaine/alcohol abuse, p/w sob and cough, likely copd exacerbation, h/o smoking crack, but no h/o asthma in childhood , as per pt. it all started after the age of 44 , also with hiv , on haart, h/o cocaine and etoh abuse.

## 2017-01-30 NOTE — PROGRESS NOTE ADULT - SUBJECTIVE AND OBJECTIVE BOX
Patient is a 57y old  Female who presents with a chief complaint of Shortness of breath (25 Jan 2017 00:47)      INTERVAL HPI / OVERNIGHT EVENTS: cough + ,no fever    MEDICATIONS  (STANDING):  heparin  Injectable 5000Unit(s) SubCutaneous every 12 hours  raltegravir Tablet 400milliGRAM(s) Oral two times a day  darunavir 800milliGRAM(s) Oral daily  famotidine    Tablet 20milliGRAM(s) Oral daily  ritonavir Tablet 100milliGRAM(s) Oral daily  thiamine 100milliGRAM(s) Oral daily  folic acid 1milliGRAM(s) Oral daily  ALBUTerol/ipratropium for Nebulization 3milliLiter(s) Nebulizer every 4 hours  levoFLOXacin IVPB  IV Intermittent   levoFLOXacin IVPB 250milliGRAM(s) IV Intermittent every 24 hours  methylPREDNISolone sodium succinate Injectable 20milliGRAM(s) IV Push every 8 hours  dextrose 5% + sodium chloride 0.45%. 1000milliLiter(s) IV Continuous <Continuous>    MEDICATIONS  (PRN):  guaiFENesin    Syrup 200milliGRAM(s) Oral every 6 hours PRN Cough  LORazepam     Tablet 2milliGRAM(s) Oral every 2 hours PRN CIWA-Ar score increase by 2 points and a total score of 7 or less  benzocaine 15 mG/menthol 3.6 mG Lozenge 1Lozenge Oral four times a day PRN Sore Throat  benzonatate 100milliGRAM(s) Oral three times a day PRN Cough      Vital Signs Last 24 Hrs  T(C): 36.7, Max: 36.8 (01-29 @ 18:08)  T(F): 98, Max: 98.2 (01-29 @ 18:08)  HR: 94 (89 - 104)  BP: 145/78 (117/84 - 145/78)  BP(mean): --  RR: 22 (18 - 22)  SpO2: 199% (95% - 199%)    PHYSICAL EXAM: has severe cough (dry - during exam)    Constitutional: NAD, well-groomed, well-developed  Respiratory: CTAB/L  Cardiovascular: S1 and S2, RRR, no M/G/R  Gastrointestinal: BS+, soft, NT/ND  Extremities: No peripheral edema  Vascular: 2+ peripheral pulses  Skin: No rashes      LABS:                        11.6   12.5  )-----------( 216      ( 30 Jan 2017 06:16 )             34.3     30 Jan 2017 06:16    146    |  105    |  25     ----------------------------<  141    4.3     |  35     |  0.84     Ca    8.0        30 Jan 2017 06:16    TPro  6.0    /  Alb  3.1    /  TBili  0.2    /  DBili  x      /  AST  20     /  ALT  42     /  AlkPhos  107    30 Jan 2017 06:16            MICROBIOLOGY:  RECENT CULTURES:  01-25 .Blood Blood XXXX XXXX   No growth at 5 days.          RADIOLOGY & ADDITIONAL STUDIES:

## 2017-01-30 NOTE — PROGRESS NOTE ADULT - SUBJECTIVE AND OBJECTIVE BOX
INTERVAL HPI/OVERNIGHT EVENTS:  57 year old female with PMH of Asthma/COPD, HIV, and cocaine abuse and PSH of appendectomy and  presents to ED complaining of shortness of breath which began on the . Patient admits to cough with sputum production and wheezing. Patient also admits to urinary frequency but denies dysuria. Patient states her last drug use was also on the  and that she usually spends about 20-40 dollars a day on crack/ cocaine. Patient also admits to smoking about 5 cigarets a day, but admits to smoking a pack per day in the past for about 30 years. Patient admits to drinking 2 wine coolers daily.  Denies high fever, chills or chest pain. Also denies catching a cold.  Comfortable in bed. no distress.  Over all feels better. Reports cough with difficult to bring up phlegm.    Vital Signs Last 24 Hrs  T(C): 36.7, Max: 36.8 ( @ 18:08)  T(F): 98, Max: 98.2 ( @ 18:08)  HR: 94 (89 - 104)  BP: 145/78 (117/84 - 145/78)  BP(mean): --  RR: 22 (18 - 22)  SpO2: 199% (95% - 199%)    PHYSICAL EXAM:  GEN:        Awake, responsive and comfortable.  HEENT:    Normal.    RESP:      no wheezing.  CVS:             Regular rate and rhythm.   ABD:         Soft, non-tender, non-distended;   :             No costovertebral angle tenderness  EXTR:            No clubbing, cyanosis or edema  CNS:              Intact sensory and motor function.    MEDICATIONS  (STANDING):  heparin  Injectable 5000Unit(s) SubCutaneous every 12 hours  raltegravir Tablet 400milliGRAM(s) Oral two times a day  darunavir 800milliGRAM(s) Oral daily  famotidine    Tablet 20milliGRAM(s) Oral daily  ritonavir Tablet 100milliGRAM(s) Oral daily  thiamine 100milliGRAM(s) Oral daily  folic acid 1milliGRAM(s) Oral daily  ALBUTerol/ipratropium for Nebulization 3milliLiter(s) Nebulizer every 4 hours  levoFLOXacin IVPB  IV Intermittent   levoFLOXacin IVPB 250milliGRAM(s) IV Intermittent every 24 hours  methylPREDNISolone sodium succinate Injectable 20milliGRAM(s) IV Push every 8 hours  dextrose 5% + sodium chloride 0.45%. 1000milliLiter(s) IV Continuous <Continuous>    MEDICATIONS  (PRN):  guaiFENesin    Syrup 200milliGRAM(s) Oral every 6 hours PRN Cough  LORazepam     Tablet 2milliGRAM(s) Oral every 2 hours PRN CIWA-Ar score increase by 2 points and a total score of 7 or less  benzocaine 15 mG/menthol 3.6 mG Lozenge 1Lozenge Oral four times a day PRN Sore Throat  benzonatate 100milliGRAM(s) Oral three times a day PRN Cough    LABS:                        11.6   12.5  )-----------( 216      ( 2017 06:16 )             34.3     2017 06:16    146    |  105    |  25     ----------------------------<  141    4.3     |  35     |  0.84     Ca    8.0        2017 06:16    TPro  6.0    /  Alb  3.1    /  TBili  0.2    /  DBili  x      /  AST  20     /  ALT  42     /  AlkPhos  107    2017 06:16    ASSESSMENT AND PLAN:  ·	SOB with COUGH. improving.  ·	Acute COPD exacerbation.  ·	Corona virus tracheobronchitis  ·	Cocaine abuse.  ·	Alcohol abuse.  ·	Tobacco abuse.  ·	HIV.    Will add Mucomyst.  SPO2 on room air before discharge for O2 need.

## 2017-01-30 NOTE — PROGRESS NOTE ADULT - SUBJECTIVE AND OBJECTIVE BOX
CHIEF COMPLAINT/INTERVAL HISTORY:    Patient is a 57y old  Female who presents with a chief complaint of Shortness of breath (2017 00:47)      HPI:  57 year old female with PMH of asthma, HIV, and cocaine abuse and PSH of appendectomy and  presents to ED complaining of shortness of breath which began on the . Patient admits to cough with sputum production and wheezing. Patient also admits to urinary frequency but denies dysuria. Patient states her last drug use was also on the  and that she usually spends about 20-40 dollars a day on crack/ cocaine. Patient also admits to smoking about 5 cigarets a day, but admits to smoking a pack per day in the past for about 30 years. Patient admits to drinking 2 wine coolers daily. (2017 00:47)      SUBJECTIVE & OBJECTIVE: Pt seen and examined at bedside.   c/o cough , dyspnea getting better, afebrile.  Denies chest pain, nausea/vomiting/diarrhea,  dizziness, HA or blurry vision, all other ROS negative.  Eating well      Vital Signs Last 24 Hrs  T(C): 36.7, Max: 36.8 ( @ 18:08)  T(F): 98, Max: 98.2 ( @ 18:08)  HR: 94 (89 - 104)  BP: 145/78 (117/84 - 145/78)  BP(mean): --  ABP: --  ABP(mean): --  RR: 22 (18 - 22)  SpO2: 199% (95% - 199%)        MEDICATIONS  (STANDING):  heparin  Injectable 5000Unit(s) SubCutaneous every 12 hours  raltegravir Tablet 400milliGRAM(s) Oral two times a day  darunavir 800milliGRAM(s) Oral daily  famotidine    Tablet 20milliGRAM(s) Oral daily  ritonavir Tablet 100milliGRAM(s) Oral daily  thiamine 100milliGRAM(s) Oral daily  folic acid 1milliGRAM(s) Oral daily  ALBUTerol/ipratropium for Nebulization 3milliLiter(s) Nebulizer every 4 hours  levoFLOXacin IVPB  IV Intermittent   levoFLOXacin IVPB 250milliGRAM(s) IV Intermittent every 24 hours  methylPREDNISolone sodium succinate Injectable 20milliGRAM(s) IV Push every 8 hours  dextrose 5% + sodium chloride 0.45%. 1000milliLiter(s) IV Continuous <Continuous>    MEDICATIONS  (PRN):  guaiFENesin    Syrup 200milliGRAM(s) Oral every 6 hours PRN Cough  LORazepam     Tablet 2milliGRAM(s) Oral every 2 hours PRN CIWA-Ar score increase by 2 points and a total score of 7 or less  benzocaine 15 mG/menthol 3.6 mG Lozenge 1Lozenge Oral four times a day PRN Sore Throat  benzonatate 100milliGRAM(s) Oral three times a day PRN Cough        PHYSICAL EXAM:    GENERAL: NAD, cachectic, AAOx3  HEAD:  Atraumatic, Normocephalic  EYES: EOMI, PERRLA, conjunctiva and sclera clear  ENMT: Moist mucous membranes  NECK: Supple, No JVD  NERVOUS SYSTEM:  Alert & Oriented X3, Motor Strength 5/5 B/L upper and lower extremities;   CHEST/LUNG: B/L wheezing  HEART: Regular rate and rhythm;   ABDOMEN: Soft, Nontender, Nondistended; Bowel sounds present  EXTREMITIES:  no cyanosis, or edema    LABS:                        11.6   12.5  )-----------( 216      ( 2017 06:16 )             34.3     2017 06:16    146    |  105    |  25     ----------------------------<  141    4.3     |  35     |  0.84     Ca    8.0        2017 06:16    TPro  6.0    /  Alb  3.1    /  TBili  0.2    /  DBili  x      /  AST  20     /  ALT  42     /  AlkPhos  107    2017 06:16    Blood CS negative in 5 days

## 2017-01-31 DIAGNOSIS — J44.1 CHRONIC OBSTRUCTIVE PULMONARY DISEASE WITH (ACUTE) EXACERBATION: ICD-10-CM

## 2017-01-31 DIAGNOSIS — B34.2 CORONAVIRUS INFECTION, UNSPECIFIED: ICD-10-CM

## 2017-01-31 DIAGNOSIS — R09.02 HYPOXEMIA: ICD-10-CM

## 2017-01-31 LAB
ANION GAP SERPL CALC-SCNC: 4 MMOL/L — LOW (ref 5–17)
BUN SERPL-MCNC: 26 MG/DL — HIGH (ref 7–23)
CALCIUM SERPL-MCNC: 8.1 MG/DL — LOW (ref 8.5–10.1)
CHLORIDE SERPL-SCNC: 104 MMOL/L — SIGNIFICANT CHANGE UP (ref 96–108)
CO2 SERPL-SCNC: 36 MMOL/L — HIGH (ref 22–31)
CREAT SERPL-MCNC: 0.93 MG/DL — SIGNIFICANT CHANGE UP (ref 0.5–1.3)
GLUCOSE SERPL-MCNC: 223 MG/DL — HIGH (ref 70–99)
HCT VFR BLD CALC: 36.4 % — SIGNIFICANT CHANGE UP (ref 34.5–45)
HGB BLD-MCNC: 12 G/DL — SIGNIFICANT CHANGE UP (ref 11.5–15.5)
MCHC RBC-ENTMCNC: 29.8 PG — SIGNIFICANT CHANGE UP (ref 27–34)
MCHC RBC-ENTMCNC: 33 GM/DL — SIGNIFICANT CHANGE UP (ref 32–36)
MCV RBC AUTO: 90.2 FL — SIGNIFICANT CHANGE UP (ref 80–100)
PLATELET # BLD AUTO: 240 K/UL — SIGNIFICANT CHANGE UP (ref 150–400)
POTASSIUM SERPL-MCNC: 4.1 MMOL/L — SIGNIFICANT CHANGE UP (ref 3.5–5.3)
POTASSIUM SERPL-SCNC: 4.1 MMOL/L — SIGNIFICANT CHANGE UP (ref 3.5–5.3)
RBC # BLD: 4.03 M/UL — SIGNIFICANT CHANGE UP (ref 3.8–5.2)
RBC # FLD: 13.4 % — SIGNIFICANT CHANGE UP (ref 11–15)
SODIUM SERPL-SCNC: 144 MMOL/L — SIGNIFICANT CHANGE UP (ref 135–145)
WBC # BLD: 17.6 K/UL — HIGH (ref 3.8–10.5)
WBC # FLD AUTO: 17.6 K/UL — HIGH (ref 3.8–10.5)

## 2017-01-31 PROCEDURE — 99233 SBSQ HOSP IP/OBS HIGH 50: CPT

## 2017-01-31 RX ORDER — ACETAMINOPHEN 500 MG
650 TABLET ORAL EVERY 6 HOURS
Qty: 0 | Refills: 0 | Status: DISCONTINUED | OUTPATIENT
Start: 2017-01-31 | End: 2017-02-01

## 2017-01-31 RX ADMIN — Medication 100 MILLIGRAM(S): at 03:20

## 2017-01-31 RX ADMIN — Medication 3 MILLILITER(S): at 05:16

## 2017-01-31 RX ADMIN — Medication 20 MILLIGRAM(S): at 05:59

## 2017-01-31 RX ADMIN — Medication 200 MILLIGRAM(S): at 14:23

## 2017-01-31 RX ADMIN — Medication 100 MILLIGRAM(S): at 23:52

## 2017-01-31 RX ADMIN — Medication 650 MILLIGRAM(S): at 23:54

## 2017-01-31 RX ADMIN — RITONAVIR 100 MILLIGRAM(S): 100 TABLET, FILM COATED ORAL at 11:04

## 2017-01-31 RX ADMIN — Medication 100 MILLIGRAM(S): at 10:03

## 2017-01-31 RX ADMIN — Medication 20 MILLIGRAM(S): at 22:52

## 2017-01-31 RX ADMIN — Medication 1 MILLIGRAM(S): at 11:04

## 2017-01-31 RX ADMIN — BENZOCAINE AND MENTHOL 1 LOZENGE: 5; 1 LIQUID ORAL at 14:23

## 2017-01-31 RX ADMIN — BENZOCAINE AND MENTHOL 1 LOZENGE: 5; 1 LIQUID ORAL at 22:55

## 2017-01-31 RX ADMIN — RALTEGRAVIR 400 MILLIGRAM(S): 400 TABLET, FILM COATED ORAL at 17:11

## 2017-01-31 RX ADMIN — Medication 3 MILLILITER(S): at 17:56

## 2017-01-31 RX ADMIN — Medication 650 MILLIGRAM(S): at 22:54

## 2017-01-31 RX ADMIN — FAMOTIDINE 20 MILLIGRAM(S): 10 INJECTION INTRAVENOUS at 11:04

## 2017-01-31 RX ADMIN — Medication 650 MILLIGRAM(S): at 12:34

## 2017-01-31 RX ADMIN — Medication 20 MILLIGRAM(S): at 13:05

## 2017-01-31 RX ADMIN — Medication 650 MILLIGRAM(S): at 12:04

## 2017-01-31 RX ADMIN — Medication 3 MILLILITER(S): at 12:13

## 2017-01-31 RX ADMIN — Medication 2 MILLILITER(S): at 12:14

## 2017-01-31 RX ADMIN — Medication 2 MILLILITER(S): at 17:56

## 2017-01-31 RX ADMIN — HEPARIN SODIUM 5000 UNIT(S): 5000 INJECTION INTRAVENOUS; SUBCUTANEOUS at 05:59

## 2017-01-31 RX ADMIN — RALTEGRAVIR 400 MILLIGRAM(S): 400 TABLET, FILM COATED ORAL at 05:59

## 2017-01-31 RX ADMIN — Medication 3 MILLILITER(S): at 00:00

## 2017-01-31 RX ADMIN — BENZOCAINE AND MENTHOL 1 LOZENGE: 5; 1 LIQUID ORAL at 10:04

## 2017-01-31 RX ADMIN — Medication 100 MILLIGRAM(S): at 11:04

## 2017-01-31 RX ADMIN — HEPARIN SODIUM 5000 UNIT(S): 5000 INJECTION INTRAVENOUS; SUBCUTANEOUS at 17:11

## 2017-01-31 RX ADMIN — Medication 2 MILLILITER(S): at 05:16

## 2017-01-31 RX ADMIN — Medication 2 MILLILITER(S): at 00:00

## 2017-01-31 RX ADMIN — DARUNAVIR 800 MILLIGRAM(S): 75 TABLET, FILM COATED ORAL at 11:04

## 2017-01-31 NOTE — PROGRESS NOTE ADULT - PROBLEM SELECTOR PROBLEM 4
HIV (human immunodeficiency virus infection)
HIV (human immunodeficiency virus infection)
Asthma with status asthmaticus, unspecified asthma severity
BMI less than 19,adult
Asthma with acute exacerbation, unspecified asthma severity
Cocaine abuse
HIV (human immunodeficiency virus infection)

## 2017-01-31 NOTE — PROGRESS NOTE ADULT - PROBLEM SELECTOR PROBLEM 1
Asthma with status asthmaticus, unspecified asthma severity
COPD (chronic obstructive pulmonary disease) with acute bronchitis
COPD (chronic obstructive pulmonary disease) with acute bronchitis
COPD exacerbation
Cocaine abuse
COPD (chronic obstructive pulmonary disease) with acute bronchitis
Moderate persistent asthma with status asthmaticus
Moderate persistent asthma with status asthmaticus
Tracheobronchitis

## 2017-01-31 NOTE — PROGRESS NOTE ADULT - PROBLEM SELECTOR PLAN 8
- c/w home meds
likely steroid induced.  monitor WBC and temp.
likely steroid induced.  monitor WBC and temp.

## 2017-01-31 NOTE — PROGRESS NOTE ADULT - PROBLEM SELECTOR PROBLEM 5
Alcohol abuse
Alcohol abuse
Asthma with status asthmaticus, unspecified asthma severity
Severe protein-calorie malnutrition
Alcohol abuse
Cocaine abuse

## 2017-01-31 NOTE — PHYSICAL THERAPY INITIAL EVALUATION ADULT - MODIFIED CLINICAL TEST OF SENSORY INTEGRATION IN BALANCE TEST
Barthel Index: Feeding Score 10___, Bathing Score _5__, Grooming Score _5__, Dressing Score _10__, Bowels Score _10__, Bladder Score _10__, Toilet Score _10__, Transfers Score _10__, Mobility Score _0__, Stairs Score __5_,     Total Score _75__

## 2017-01-31 NOTE — PROGRESS NOTE ADULT - SUBJECTIVE AND OBJECTIVE BOX
Patient is a 57y old  Female who presents with a chief complaint of Shortness of breath (25 Jan 2017 00:47)       OVERNIGHT EVENTS: no reported events    MEDICATIONS  (STANDING):  heparin  Injectable 5000Unit(s) SubCutaneous every 12 hours  raltegravir Tablet 400milliGRAM(s) Oral two times a day  darunavir 800milliGRAM(s) Oral daily  famotidine    Tablet 20milliGRAM(s) Oral daily  ritonavir Tablet 100milliGRAM(s) Oral daily  thiamine 100milliGRAM(s) Oral daily  folic acid 1milliGRAM(s) Oral daily  levoFLOXacin IVPB  IV Intermittent   levoFLOXacin IVPB 250milliGRAM(s) IV Intermittent every 24 hours  methylPREDNISolone sodium succinate Injectable 20milliGRAM(s) IV Push every 8 hours  dextrose 5% + sodium chloride 0.45%. 1000milliLiter(s) IV Continuous <Continuous>  ALBUTerol/ipratropium for Nebulization 3milliLiter(s) Nebulizer every 6 hours  acetylcysteine 10% Inhalation 2milliLiter(s) Inhalation every 6 hours    MEDICATIONS  (PRN):  guaiFENesin    Syrup 200milliGRAM(s) Oral every 6 hours PRN Cough  LORazepam     Tablet 2milliGRAM(s) Oral every 2 hours PRN CIWA-Ar score increase by 2 points and a total score of 7 or less  benzocaine 15 mG/menthol 3.6 mG Lozenge 1Lozenge Oral four times a day PRN Sore Throat  benzonatate 100milliGRAM(s) Oral three times a day PRN Cough  acetaminophen   Tablet. 650milliGRAM(s) Oral every 6 hours PRN Mild Pain (1 - 3)     Vital Signs Last 24 Hrs  T(C): 36.7, Max: 37.1 (01-30 @ 23:38)  T(F): 98, Max: 98.8 (01-30 @ 23:38)  HR: 98 (78 - 103)  BP: 139/83 (135/84 - 145/88)  BP(mean): --  RR: 18 (17 - 18)  SpO2: 97% (96% - 100%)    PHYSICAL EXAM:  GENERAL: NAD, well-groomed, well-developed  HEAD:  Atraumatic, Normocephalic  EYES: EOMI, PERRLA, conjunctiva and sclera clear  NECK: Supple, No JVD   NERVOUS SYSTEM:  Alert & Oriented X3, Good concentration   CHEST/LUNG: Decreaded BS B/L   HEART: Regular rate and rhythm; No murmurs, rubs, or gallops  ABDOMEN: Soft, Nontender, Nondistended; Bowel sounds present  EXTREMITIES:  2+ Peripheral Pulses, No clubbing, cyanosis, or edema       LABS:                        12.0   17.6  )-----------( 240      ( 31 Jan 2017 10:21 )             36.4     31 Jan 2017 10:21    144    |  104    |  26     ----------------------------<  223    4.1     |  36     |  0.93     Ca    8.1        31 Jan 2017 10:21    TPro  6.0    /  Alb  3.1    /  TBili  0.2    /  DBili  x      /  AST  20     /  ALT  42     /  AlkPhos  107    30 Jan 2017 06:16       cardiac markers     CAPILLARY BLOOD GLUCOSE    Cultures    RADIOLOGY & ADDITIONAL TESTS:    Imaging Personally Reviewed:  [ x] YES  [ ] NO    Consultant(s) Notes Reviewed:  [ x] YES  [ ] NO    Care Discussed with Consultants/Other Providers [ x] YES  [ ] NO

## 2017-01-31 NOTE — PHYSICAL THERAPY INITIAL EVALUATION ADULT - GENERAL OBSERVATIONS, REHAB EVAL
Pt encountered seated at edge of bed, alert and oriented x 4, IV and 4L O2 supplemental oxygen via nasal canula, NAD.

## 2017-01-31 NOTE — PROGRESS NOTE ADULT - PROBLEM SELECTOR PLAN 5
- nutrition supplements  -nutrition input noted
CIWA score- zero  counseled and educated for alcohol abstinence, follow CIWA protocol and ativan prn.  thiamine/FA supplementation.
counseled and educated for alcohol abstinence, follow CIWA protocol and ativan prn.  thiamine/FA supplementation.
counseled and educated for alcohol abstinence, CIWA protocol and ativan prn.  thiamine/FA supplementation.

## 2017-01-31 NOTE — PHYSICAL THERAPY INITIAL EVALUATION ADULT - GAIT DEVIATIONS NOTED, PT EVAL
increased time in double stance/decreased step length/decreased weight-shifting ability/decreased stride length/decreased jack

## 2017-01-31 NOTE — PROGRESS NOTE ADULT - PROBLEM SELECTOR PLAN 7
-couselled/education provided
due to dehydration  hypotonic IVF, oral free water  check bmp in am
due to dehydration  hypotonic IVF, oral free water  check bmp in am
due to dehydration  IVF.  check bmp in am

## 2017-01-31 NOTE — PHYSICAL THERAPY INITIAL EVALUATION ADULT - ADDITIONAL COMMENTS
Pt reporting that she has a cane but does not use it. Pt drives short distances into community for errands but has not done so since November, prior to that she would go out for errands 1-2x a month, mostly a household ambulator. there aer 3 steps to enter home with handrail. Pt has a nebulizer at home, does not have home oxygen.

## 2017-01-31 NOTE — PROGRESS NOTE ADULT - PROBLEM SELECTOR PROBLEM 6
Severe protein-calorie malnutrition
Severe protein-calorie malnutrition
Coronavirus infection
Severe protein-calorie malnutrition

## 2017-01-31 NOTE — PROGRESS NOTE ADULT - PROBLEM SELECTOR PROBLEM 3
Cocaine abuse
COPD (chronic obstructive pulmonary disease) with acute bronchitis
Hypoxia
Cocaine abuse
HIV (human immunodeficiency virus infection)
HIV (human immunodeficiency virus infection)
Severe protein-calorie malnutrition

## 2017-01-31 NOTE — PROGRESS NOTE ADULT - PROBLEM SELECTOR PLAN 1
-on Levaquin, duonebs, steroids  - Pt desaturating to 84%on RA upon ambulation, she will need home O2,   case Mn aware and making arrangements  - monitor O2 sat
history ofcocaine abuse
steroids, nebulizer, oxygen supplementation.  add levaquin  Pulmonary f/u appreciated, taper steroids
steroids, nebulizer, oxygen supplementation.  levaqin  Pulmonary f/u appreciated, taper steroids  DC planning
Viral (+ corona virus in [facility])   also on levaquin  will finish a 7 day course (day 4 today)  Contniue tessalon perles as needed
pt is on methylprednisonealbuteral.
she has persistant asthma and on broncho diltors
steroids, nebulizer, oxygen supplementation.  Pulmonary eval appreciated
cont nebs,oxygen and steroids

## 2017-01-31 NOTE — PROGRESS NOTE ADULT - PROBLEM SELECTOR PLAN 2
-as above
alcohol abuse
corona virus acute tracheobronchitis, iv hydration, maintain droplet/contact precaution.
corona virus acute tracheobronchitis, maintain droplet/contact precaution.
corona virus acute tracheobronchitis, iv hydration, droplet precaution.
leily sec to steroids,no ZPNA on CT at admission  check PCT
pt is using cocaine for log times
she uses alcohol
cont HAART  change Norvir to Q day (t takes at home)

## 2017-01-31 NOTE — PROGRESS NOTE ADULT - PROBLEM SELECTOR PROBLEM 2
HIV (human immunodeficiency virus infection)
Tracheobronchitis
Tracheobronchitis
Alcohol abuse
Tracheobronchitis
Alcohol abuse
Cocaine abuse
Leukocytosis, unspecified type
Tracheobronchitis

## 2017-01-31 NOTE — PROGRESS NOTE ADULT - PROBLEM SELECTOR PLAN 6
Nutritionist/dietician jimmie appreciated, supplement ensure
Nutritionist/dietician jimmie appreciated, supplement ensure
PO hydration  ID f/u
Nutritionist/dietician eval, ensure

## 2017-01-31 NOTE — PROGRESS NOTE ADULT - PROBLEM SELECTOR PLAN 3
Pt desaturating to 84%on RA upon ambulation, she will need home O2,   case Mn aware and making arrangements  - monitor O2 sat-
counseled and educated to stop drug abuse
counseled and educated to stop drug abuse
long standing copd .
On HAART   undetectable per pt
counseled and educated to stop drug abuse
due to multifactorial reason  1 asthma  2 alcohol abuse
hiv infection and is on medicatio

## 2017-01-31 NOTE — PROGRESS NOTE ADULT - PROBLEM SELECTOR PROBLEM 8
Leukocytosis, unspecified type
Leukocytosis, unspecified type
HIV (human immunodeficiency virus infection)

## 2017-02-01 VITALS
DIASTOLIC BLOOD PRESSURE: 83 MMHG | OXYGEN SATURATION: 98 % | TEMPERATURE: 99 F | SYSTOLIC BLOOD PRESSURE: 141 MMHG | RESPIRATION RATE: 17 BRPM | HEART RATE: 104 BPM

## 2017-02-01 PROCEDURE — 99239 HOSP IP/OBS DSCHRG MGMT >30: CPT

## 2017-02-01 RX ORDER — MOMETASONE FUROATE 220 UG/1
200 INHALANT RESPIRATORY (INHALATION)
Qty: 1 | Refills: 0 | OUTPATIENT
Start: 2017-02-01

## 2017-02-01 RX ORDER — BENZOCAINE AND MENTHOL 5; 1 G/100ML; G/100ML
1 LIQUID ORAL EVERY 6 HOURS
Qty: 0 | Refills: 0 | Status: DISCONTINUED | OUTPATIENT
Start: 2017-02-01 | End: 2017-02-01

## 2017-02-01 RX ORDER — ACETAMINOPHEN 500 MG
2 TABLET ORAL
Qty: 40 | Refills: 0 | OUTPATIENT
Start: 2017-02-01 | End: 2017-02-06

## 2017-02-01 RX ORDER — FOLIC ACID 0.8 MG
1 TABLET ORAL
Qty: 30 | Refills: 0 | OUTPATIENT
Start: 2017-02-01 | End: 2017-03-03

## 2017-02-01 RX ORDER — THIAMINE MONONITRATE (VIT B1) 100 MG
1 TABLET ORAL
Qty: 30 | Refills: 0 | OUTPATIENT
Start: 2017-02-01 | End: 2017-03-03

## 2017-02-01 RX ORDER — BENZOCAINE AND MENTHOL 5; 1 G/100ML; G/100ML
1 LIQUID ORAL
Qty: 20 | Refills: 0 | OUTPATIENT
Start: 2017-02-01 | End: 2017-02-06

## 2017-02-01 RX ORDER — ALBUTEROL 90 UG/1
2 AEROSOL, METERED ORAL
Qty: 1 | Refills: 0 | OUTPATIENT
Start: 2017-02-01 | End: 2017-03-03

## 2017-02-01 RX ORDER — IPRATROPIUM/ALBUTEROL SULFATE 18-103MCG
3 AEROSOL WITH ADAPTER (GRAM) INHALATION
Qty: 1 | Refills: 0 | OUTPATIENT
Start: 2017-02-01 | End: 2017-03-03

## 2017-02-01 RX ADMIN — Medication 3 MILLILITER(S): at 12:03

## 2017-02-01 RX ADMIN — RALTEGRAVIR 400 MILLIGRAM(S): 400 TABLET, FILM COATED ORAL at 05:32

## 2017-02-01 RX ADMIN — HEPARIN SODIUM 5000 UNIT(S): 5000 INJECTION INTRAVENOUS; SUBCUTANEOUS at 05:32

## 2017-02-01 RX ADMIN — Medication 20 MILLIGRAM(S): at 05:33

## 2017-02-01 RX ADMIN — DARUNAVIR 800 MILLIGRAM(S): 75 TABLET, FILM COATED ORAL at 11:47

## 2017-02-01 RX ADMIN — BENZOCAINE AND MENTHOL 1 LOZENGE: 5; 1 LIQUID ORAL at 06:37

## 2017-02-01 RX ADMIN — FAMOTIDINE 20 MILLIGRAM(S): 10 INJECTION INTRAVENOUS at 11:46

## 2017-02-01 RX ADMIN — Medication 650 MILLIGRAM(S): at 07:37

## 2017-02-01 RX ADMIN — Medication 650 MILLIGRAM(S): at 06:37

## 2017-02-01 RX ADMIN — Medication 2 MILLILITER(S): at 12:03

## 2017-02-01 RX ADMIN — Medication 100 MILLIGRAM(S): at 11:46

## 2017-02-01 RX ADMIN — Medication 2 MILLILITER(S): at 06:49

## 2017-02-01 RX ADMIN — Medication 3 MILLILITER(S): at 01:05

## 2017-02-01 RX ADMIN — RITONAVIR 100 MILLIGRAM(S): 100 TABLET, FILM COATED ORAL at 11:47

## 2017-02-01 RX ADMIN — Medication 1 MILLIGRAM(S): at 11:46

## 2017-02-01 RX ADMIN — Medication 3 MILLILITER(S): at 06:47

## 2017-02-01 RX ADMIN — Medication 2 MILLILITER(S): at 01:04

## 2017-02-01 NOTE — DISCHARGE NOTE ADULT - HOSPITAL COURSE
58 y/o F with h/o cocaine/alcohol abuse, HIV, smokes coacine p/w sob and cough, likely copd exacerbation as CT shows emphysema, also found to have positive corona virus, ID and pulmonary consulted, started on Duoneb, IV steroids, levaquin and O2 supplementation, her HAART therapy as per home continued, counseled and educated to stop abusing drugs/alcohol and stop smoking crack. Dietician consulted for severe PCM and nutrition counseling done,  For low Pulse ox on room air she is deemed eligible for home oxygen, which is arranged by [facility].  Steroids tapered, as per ID and pulmonary Ok to DC home.  Afebrile, saturating well on RA, symptoms improving,  leukocytosis likely secondary to steroids as pt. afebrile and negative PCT.  Pt. and family instructed to get referral for pulmonologist from PCP and get PFTs as outpatient.

## 2017-02-01 NOTE — PROGRESS NOTE ADULT - SUBJECTIVE AND OBJECTIVE BOX
INTERVAL HPI/OVERNIGHT EVENTS:  57 year old female with PMH of Asthma/COPD, HIV, and cocaine abuse and PSH of appendectomy and  presents to ED complaining of shortness of breath which began on the . Patient admits to cough with sputum production and wheezing. Patient also admits to urinary frequency but denies dysuria. Patient states her last drug use was also on the  and that she usually spends about 20-40 dollars a day on crack/ cocaine. Patient also admits to smoking about 5 cigarets a day, but admits to smoking a pack per day in the past for about 30 years. Patient admits to drinking 2 wine coolers daily.  Denies high fever, chills or chest pain. Also denies catching a cold.  Comfortable and feels better. SPO2 on RA is 87%.    Vital Signs Last 24 Hrs  T(C): 36.8, Max: 37.1 ( @ 13:12)  T(F): 98.2, Max: 98.8 ( @ 13:12)  HR: 110 (70 - 110)  BP: 152/86 (145/70 - 152/86)  BP(mean): --  RR: 18 (16 - 18)  SpO2: 99% (96% - 99%)    PHYSICAL EXAM:  GEN:         Awake, responsive and comfortable.  HEENT:    Normal.    RESP:      decreased air entry  CVS:          Regular rate and rhythm.   ABD:         Soft, non-tender, non-distended;   :            No costovertebral angle tenderness  EXTR:          No clubbing, cyanosis or edema  CNS:           Intact sensory and motor function.    MEDICATIONS  (STANDING):  heparin  Injectable 5000Unit(s) SubCutaneous every 12 hours  raltegravir Tablet 400milliGRAM(s) Oral two times a day  darunavir 800milliGRAM(s) Oral daily  famotidine    Tablet 20milliGRAM(s) Oral daily  ritonavir Tablet 100milliGRAM(s) Oral daily  thiamine 100milliGRAM(s) Oral daily  folic acid 1milliGRAM(s) Oral daily  levoFLOXacin IVPB  IV Intermittent   levoFLOXacin IVPB 250milliGRAM(s) IV Intermittent every 24 hours  dextrose 5% + sodium chloride 0.45%. 1000milliLiter(s) IV Continuous <Continuous>  ALBUTerol/ipratropium for Nebulization 3milliLiter(s) Nebulizer every 6 hours  acetylcysteine 10% Inhalation 2milliLiter(s) Inhalation every 6 hours  methylPREDNISolone sodium succinate Injectable 20milliGRAM(s) IV Push every 12 hours  benzocaine 15 mG/menthol 3.6 mG Lozenge 1Lozenge Oral every 6 hours    MEDICATIONS  (PRN):  guaiFENesin    Syrup 200milliGRAM(s) Oral every 6 hours PRN Cough  LORazepam     Tablet 2milliGRAM(s) Oral every 2 hours PRN CIWA-Ar score increase by 2 points and a total score of 7 or less  benzonatate 100milliGRAM(s) Oral three times a day PRN Cough  acetaminophen   Tablet. 650milliGRAM(s) Oral every 6 hours PRN Mild Pain (1 - 3)  aluminum hydroxide/magnesium hydroxide/simethicone Suspension 30milliLiter(s) Oral every 6 hours PRN Dyspepsia    LABS:                        12.0   17.6  )-----------( 240      ( 2017 10:21 )             36.4     2017 10:21    144    |  104    |  26     ----------------------------<  223    4.1     |  36     |  0.93     Ca    8.1        2017 10:21    ASSESSMENT AND PLAN:  ·	SOB with COUGH. improving.  ·	Acute COPD exacerbation. improved.  ·	Corona virus tracheobronchitis  ·	Cocaine abuse.  ·	Alcohol abuse.  ·	Tobacco abuse.  ·	HIV.  ·	Hypoxia, 87% on Room air.    Pulmonary status improved and stable.  For discharge home on O2. tapering steroids and bronchodilators.            :

## 2017-02-01 NOTE — DISCHARGE NOTE ADULT - MEDICATION SUMMARY - MEDICATIONS TO STOP TAKING
I will STOP taking the medications listed below when I get home from the hospital:    Mucomyst  --   10% soln, 6mL via neb TID PRN congestion    Combivent 18 mcg-103 mcg-/inh inhalation aerosol  --  inhaled 2puff q6prn wheezing

## 2017-02-01 NOTE — DISCHARGE NOTE ADULT - MEDICATION SUMMARY - MEDICATIONS TO TAKE
I will START or STAY ON the medications listed below when I get home from the hospital:    predniSONE 10 mg oral tablet  -- 3 tabs PO daily for 2 days, then 2 tabs PO daily x 2 days then 1 tab PO daily x 3 days  -- It is very important that you take or use this exactly as directed.  Do not skip doses or discontinue unless directed by your doctor.  Obtain medical advice before taking any non-prescription drugs as some may affect the action of this medication.  Take with food or milk.    -- Indication: For COPD exacerbation    acetaminophen 325 mg oral tablet  -- 2 tab(s) by mouth every 6 hours, As needed, Mild Pain (1 - 3)  -- Indication: For Pain    aluminum hydroxide-magnesium hydroxide 200 mg-200 mg/5 mL oral suspension  -- 30 milliliter(s) by mouth every 6 hours, As needed, Dyspepsia  -- Indication: For Heart burn/dyspepsia    Tessalon Perles 100 mg oral capsule  -- 1 cap(s) by mouth 3 times a day, As needed, Cough  -- Indication: For Cough    Isentress 400 mg oral tablet  -- 1 tab(s) by mouth 2 times a day  -- Indication: For HIV (human immunodeficiency virus infection)    Prezista 800 mg oral tablet  -- 1 tab(s) by mouth once a day  -- Indication: For HIV (human immunodeficiency virus infection)    Norvir 100 mg oral tablet  -- 1 tab(s) by mouth once a day  -- Indication: For HIV (human immunodeficiency virus infection)    ProAir HFA 90 mcg/inh inhalation aerosol  -- 2 puff(s) inhaled 4 times a day as needed for shortness of breath /wheezing  -- For inhalation only.  It is very important that you take or use this exactly as directed.  Do not skip doses or discontinue unless directed by your doctor.  Obtain medical advice before taking any non-prescription drugs as some may affect the action of this medication.  Shake well before use.    -- Indication: For COPD (chronic obstructive pulmonary disease) with acute bronchitis    albuterol-ipratropium 2.5 mg-0.5 mg/3 mL inhalation solution  -- 3 milliliter(s) inhaled every 6 hours as needed for shortness of breath or wheezing  -- Indication: For COPD (chronic obstructive pulmonary disease) with acute bronchitis    guaiFENesin 100 mg/5 mL oral liquid  -- 10 milliliter(s) by mouth every 6 hours, As needed, Cough  -- Indication: For Cough    famotidine 10 mg oral tablet  -- 1 tab(s) by mouth 2 times a day  -- Indication: For Heart burn    benzocaine-menthol 15 mg-3.6 mg mucous membrane lozenge  -- 1 dose(s) mucous membrane 4 times a day as needed  -- Indication: For itchy throat    Levaquin 250 mg oral tablet  -- 1 tab(s) by mouth once a day x 2 more days  -- Avoid prolonged or excessive exposure to direct and/or artificial sunlight while taking this medication.  Do not take dairy products, antacids, or iron preparations within one hour of this medication.  Finish all this medication unless otherwise directed by prescriber.  May cause drowsiness or dizziness.  Medication should be taken with plenty of water.    -- Indication: For COPD (chronic obstructive pulmonary disease) with acute bronchitis    Asmanex  mcg/inh inhalation aerosol  -- 200 microgram(s) inhaled 2 times a day  -- Check with your doctor before becoming pregnant.  For inhalation only.  It is very important that you take or use this exactly as directed.  Do not skip doses or discontinue unless directed by your doctor.  Rinse mouth thoroughly after use.  Shake well before use.    -- Indication: For COPD    folic acid 1 mg oral tablet  -- 1 tab(s) by mouth once a day  -- Indication: For Supplement    thiamine 100 mg oral tablet  -- 1 tab(s) by mouth once a day  -- Indication: For Supplement I will START or STAY ON the medications listed below when I get home from the hospital:    Home PT  -- Dx: Severe PCM, COPD, generalized weakness  -- Indication: For weakness    predniSONE 10 mg oral tablet  -- 3 tabs PO daily for 2 days, then 2 tabs PO daily x 2 days then 1 tab PO daily x 3 days  -- It is very important that you take or use this exactly as directed.  Do not skip doses or discontinue unless directed by your doctor.  Obtain medical advice before taking any non-prescription drugs as some may affect the action of this medication.  Take with food or milk.    -- Indication: For COPD exacerbation    acetaminophen 325 mg oral tablet  -- 2 tab(s) by mouth every 6 hours, As needed, Mild Pain (1 - 3)  -- Indication: For Pain    aluminum hydroxide-magnesium hydroxide 200 mg-200 mg/5 mL oral suspension  -- 30 milliliter(s) by mouth every 6 hours, As needed, Dyspepsia  -- Indication: For Heart burn/dyspepsia    Tessalon Perles 100 mg oral capsule  -- 1 cap(s) by mouth 3 times a day, As needed, Cough  -- Indication: For Cough    Isentress 400 mg oral tablet  -- 1 tab(s) by mouth 2 times a day  -- Indication: For HIV (human immunodeficiency virus infection)    Prezista 800 mg oral tablet  -- 1 tab(s) by mouth once a day  -- Indication: For HIV (human immunodeficiency virus infection)    Norvir 100 mg oral tablet  -- 1 tab(s) by mouth once a day  -- Indication: For HIV (human immunodeficiency virus infection)    ProAir HFA 90 mcg/inh inhalation aerosol  -- 2 puff(s) inhaled 4 times a day as needed for shortness of breath /wheezing  -- For inhalation only.  It is very important that you take or use this exactly as directed.  Do not skip doses or discontinue unless directed by your doctor.  Obtain medical advice before taking any non-prescription drugs as some may affect the action of this medication.  Shake well before use.    -- Indication: For COPD (chronic obstructive pulmonary disease) with acute bronchitis    albuterol-ipratropium 2.5 mg-0.5 mg/3 mL inhalation solution  -- 3 milliliter(s) inhaled every 6 hours as needed for shortness of breath or wheezing  -- Indication: For COPD (chronic obstructive pulmonary disease) with acute bronchitis    guaiFENesin 100 mg/5 mL oral liquid  -- 10 milliliter(s) by mouth every 6 hours, As needed, Cough  -- Indication: For Cough    famotidine 10 mg oral tablet  -- 1 tab(s) by mouth 2 times a day  -- Indication: For Heart burn    benzocaine-menthol 15 mg-3.6 mg mucous membrane lozenge  -- 1 dose(s) mucous membrane 4 times a day as needed  -- Indication: For itchy throat    Levaquin 250 mg oral tablet  -- 1 tab(s) by mouth once a day x 2 more days  -- Avoid prolonged or excessive exposure to direct and/or artificial sunlight while taking this medication.  Do not take dairy products, antacids, or iron preparations within one hour of this medication.  Finish all this medication unless otherwise directed by prescriber.  May cause drowsiness or dizziness.  Medication should be taken with plenty of water.    -- Indication: For COPD (chronic obstructive pulmonary disease) with acute bronchitis    Asmanex  mcg/inh inhalation aerosol  -- 200 microgram(s) inhaled 2 times a day  -- Check with your doctor before becoming pregnant.  For inhalation only.  It is very important that you take or use this exactly as directed.  Do not skip doses or discontinue unless directed by your doctor.  Rinse mouth thoroughly after use.  Shake well before use.    -- Indication: For COPD    folic acid 1 mg oral tablet  -- 1 tab(s) by mouth once a day  -- Indication: For Supplement    thiamine 100 mg oral tablet  -- 1 tab(s) by mouth once a day  -- Indication: For Supplement

## 2017-02-01 NOTE — DISCHARGE NOTE ADULT - PLAN OF CARE
avoid acute exacerbation in future take medications as directed, follow with PCP to get referral for Pulmonary doctor to get Lung function tests PFTs and take medications as directed, use oxygen all the time as directed. Counseled and educated to stop abusing drugs take high Protein diet and Protein supplements like Ensure. Counseled and educated to stop drinking alcohol. drink plenty of fluids continue home medications

## 2017-02-01 NOTE — DISCHARGE NOTE ADULT - CARE PLAN
Principal Discharge DX:	COPD (chronic obstructive pulmonary disease) with acute bronchitis  Goal:	avoid acute exacerbation in future  Instructions for follow-up, activity and diet:	take medications as directed, follow with PCP to get referral for Pulmonary doctor to get Lung function tests PFTs and take medications as directed, use oxygen all the time as directed.  Secondary Diagnosis:	Cocaine abuse  Instructions for follow-up, activity and diet:	Counseled and educated to stop abusing drugs  Secondary Diagnosis:	Severe protein-calorie malnutrition  Instructions for follow-up, activity and diet:	take high Protein diet and Protein supplements like Ensure.  Secondary Diagnosis:	Alcohol abuse  Instructions for follow-up, activity and diet:	Counseled and educated to stop drinking alcohol.  Secondary Diagnosis:	Coronavirus infection  Instructions for follow-up, activity and diet:	drink plenty of fluids  Secondary Diagnosis:	HIV (human immunodeficiency virus infection)  Instructions for follow-up, activity and diet:	continue home medications

## 2017-02-01 NOTE — DISCHARGE NOTE ADULT - SECONDARY DIAGNOSIS.
Cocaine abuse Severe protein-calorie malnutrition Alcohol abuse Coronavirus infection HIV (human immunodeficiency virus infection)

## 2017-02-01 NOTE — DISCHARGE NOTE ADULT - PATIENT PORTAL LINK FT
“You can access the Follow[facility] Patient Portal, offered by [facility], by registering with the following website: [email or web address]”

## 2017-02-01 NOTE — DISCHARGE NOTE ADULT - CARE PROVIDER_API CALL
Avinash Whitman  Follow [facility] PCP Dr. Avinash Whitman  Phone: (   )    -  Fax: (   )    -

## 2017-02-01 NOTE — DISCHARGE NOTE ADULT - ADDITIONAL INSTRUCTIONS
Follow with your PCP x 3 -5 days.  Get referral from PCP for pulmonary doctor as you need PFTs (lung function tests)

## 2017-02-01 NOTE — PROGRESS NOTE ADULT - PROVIDER SPECIALTY LIST ADULT
Hospitalist
Infectious Disease
Infectious Disease
Pulmonology
Hospitalist

## 2017-02-01 NOTE — DISCHARGE NOTE ADULT - CONDITION (STATED IN TERMS THAT PERMIT A SPECIFIC MEASURABLE COMPARISON WITH CONDITION ON ADMISSION):
Patient is stable: tolerating diet, voiding, ambulating, no pain, saturating well on RA  Denies chest pain/SOB, nausea/vomiting/diarrhea,  dizziness, HA or blurry vision, still has cough, improving, all other ROS negative.    Vital Signs Last 24 Hrs  T(C): 36.8, Max: 37.1 (01-31 @ 13:12)  T(F): 98.2, Max: 98.8 (01-31 @ 13:12)  HR: 110 (70 - 110)  BP: 152/86 (145/70 - 152/86) mild elevation of BP likely due to steroids, advised low sodium diet  BP(mean): --  RR: 18 (16 - 18)  SpO2: 99% (96% - 99%) on oxygen 2lpm  Gen: NAD, AAOX3, malnourished  HEENT: PERRLA, EOMI, mild pallor  CVS: S1, S2, Regular  Lungs: generalized decreased BS  Abd: soft, non tender, no organomegaly, BS present  Extremities: No cyanosis, no edema  Neuro: Non Focal, strength 5/5 all 4 extremities, Cranial nerves intact  70 minutes spent in direct patient care including physical examination, discharge instructions , medication instructions and follow up, patient and her daughters verbalized understanding and agreed.

## 2017-02-01 NOTE — DISCHARGE NOTE ADULT - PROVIDER TOKENS
FREE:[LAST:[J Carlos],FIRST:[Avinash],PHONE:[(   )    -],FAX:[(   )    -],ADDRESS:[Follow [facility] PCP Dr. Avinash Whitman]]

## 2017-02-03 DIAGNOSIS — F14.10 COCAINE ABUSE, UNCOMPLICATED: ICD-10-CM

## 2017-02-03 DIAGNOSIS — J44.1 CHRONIC OBSTRUCTIVE PULMONARY DISEASE WITH (ACUTE) EXACERBATION: ICD-10-CM

## 2017-02-03 DIAGNOSIS — J44.0 CHRONIC OBSTRUCTIVE PULMONARY DISEASE WITH ACUTE LOWER RESPIRATORY INFECTION: ICD-10-CM

## 2017-02-03 DIAGNOSIS — J45.42 MODERATE PERSISTENT ASTHMA WITH STATUS ASTHMATICUS: ICD-10-CM

## 2017-02-03 DIAGNOSIS — F17.210 NICOTINE DEPENDENCE, CIGARETTES, UNCOMPLICATED: ICD-10-CM

## 2017-02-03 DIAGNOSIS — F10.10 ALCOHOL ABUSE, UNCOMPLICATED: ICD-10-CM

## 2017-02-03 DIAGNOSIS — R06.00 DYSPNEA, UNSPECIFIED: ICD-10-CM

## 2017-02-03 DIAGNOSIS — J20.8 ACUTE BRONCHITIS DUE TO OTHER SPECIFIED ORGANISMS: ICD-10-CM

## 2017-02-03 DIAGNOSIS — B20 HUMAN IMMUNODEFICIENCY VIRUS [HIV] DISEASE: ICD-10-CM

## 2017-02-03 DIAGNOSIS — E43 UNSPECIFIED SEVERE PROTEIN-CALORIE MALNUTRITION: ICD-10-CM

## 2017-11-03 ENCOUNTER — INPATIENT (INPATIENT)
Facility: HOSPITAL | Age: 57
LOS: 2 days | End: 2017-11-06
Attending: STUDENT IN AN ORGANIZED HEALTH CARE EDUCATION/TRAINING PROGRAM | Admitting: SURGERY
Payer: MEDICAID

## 2017-11-03 ENCOUNTER — TRANSCRIPTION ENCOUNTER (OUTPATIENT)
Age: 57
End: 2017-11-03

## 2017-11-03 VITALS
HEART RATE: 63 BPM | HEIGHT: 64 IN | DIASTOLIC BLOOD PRESSURE: 102 MMHG | WEIGHT: 89.95 LBS | SYSTOLIC BLOOD PRESSURE: 158 MMHG | OXYGEN SATURATION: 100 % | TEMPERATURE: 98 F | RESPIRATION RATE: 17 BRPM

## 2017-11-03 DIAGNOSIS — Z98.891 HISTORY OF UTERINE SCAR FROM PREVIOUS SURGERY: Chronic | ICD-10-CM

## 2017-11-03 PROCEDURE — 99285 EMERGENCY DEPT VISIT HI MDM: CPT | Mod: 25

## 2017-11-03 RX ORDER — SODIUM CHLORIDE 9 MG/ML
1000 INJECTION INTRAMUSCULAR; INTRAVENOUS; SUBCUTANEOUS ONCE
Qty: 0 | Refills: 0 | Status: COMPLETED | OUTPATIENT
Start: 2017-11-03 | End: 2017-11-03

## 2017-11-03 RX ORDER — ONDANSETRON 8 MG/1
4 TABLET, FILM COATED ORAL ONCE
Qty: 0 | Refills: 0 | Status: COMPLETED | OUTPATIENT
Start: 2017-11-03 | End: 2017-11-03

## 2017-11-03 RX ORDER — IOHEXOL 300 MG/ML
30 INJECTION, SOLUTION INTRAVENOUS ONCE
Qty: 0 | Refills: 0 | Status: COMPLETED | OUTPATIENT
Start: 2017-11-03 | End: 2017-11-04

## 2017-11-03 RX ORDER — FAMOTIDINE 10 MG/ML
1 INJECTION INTRAVENOUS
Qty: 0 | Refills: 0 | COMMUNITY

## 2017-11-03 RX ORDER — MORPHINE SULFATE 50 MG/1
4 CAPSULE, EXTENDED RELEASE ORAL ONCE
Qty: 0 | Refills: 0 | Status: DISCONTINUED | OUTPATIENT
Start: 2017-11-03 | End: 2017-11-03

## 2017-11-03 RX ADMIN — SODIUM CHLORIDE 2000 MILLILITER(S): 9 INJECTION INTRAMUSCULAR; INTRAVENOUS; SUBCUTANEOUS at 23:35

## 2017-11-03 NOTE — ED ADULT NURSE NOTE - OBJECTIVE STATEMENT
Pt c/o abd & back pain with N/V since earlier today. Denies diarrhea. Last BM yesterday small. Poor appetite.

## 2017-11-04 ENCOUNTER — RESULT REVIEW (OUTPATIENT)
Age: 57
End: 2017-11-04

## 2017-11-04 DIAGNOSIS — K55.9 VASCULAR DISORDER OF INTESTINE, UNSPECIFIED: ICD-10-CM

## 2017-11-04 DIAGNOSIS — F14.10 COCAINE ABUSE, UNCOMPLICATED: ICD-10-CM

## 2017-11-04 DIAGNOSIS — J45.998 OTHER ASTHMA: ICD-10-CM

## 2017-11-04 DIAGNOSIS — A41.9 SEPSIS, UNSPECIFIED ORGANISM: ICD-10-CM

## 2017-11-04 DIAGNOSIS — K92.0 HEMATEMESIS: ICD-10-CM

## 2017-11-04 DIAGNOSIS — R10.84 GENERALIZED ABDOMINAL PAIN: ICD-10-CM

## 2017-11-04 DIAGNOSIS — B20 HUMAN IMMUNODEFICIENCY VIRUS [HIV] DISEASE: ICD-10-CM

## 2017-11-04 DIAGNOSIS — I95.9 HYPOTENSION, UNSPECIFIED: ICD-10-CM

## 2017-11-04 LAB
ALBUMIN SERPL ELPH-MCNC: 2.6 G/DL — LOW (ref 3.3–5)
ALBUMIN SERPL ELPH-MCNC: 4 G/DL — SIGNIFICANT CHANGE UP (ref 3.3–5)
ALBUMIN SERPL ELPH-MCNC: 4.2 G/DL — SIGNIFICANT CHANGE UP (ref 3.3–5)
ALP SERPL-CCNC: 58 U/L — SIGNIFICANT CHANGE UP (ref 40–120)
ALP SERPL-CCNC: 72 U/L — SIGNIFICANT CHANGE UP (ref 40–120)
ALP SERPL-CCNC: 74 U/L — SIGNIFICANT CHANGE UP (ref 40–120)
ALT FLD-CCNC: 24 U/L — SIGNIFICANT CHANGE UP (ref 12–78)
ALT FLD-CCNC: 33 U/L — SIGNIFICANT CHANGE UP (ref 12–78)
ALT FLD-CCNC: 38 U/L — SIGNIFICANT CHANGE UP (ref 12–78)
AMMONIA BLD-MCNC: 31 UMOL/L — SIGNIFICANT CHANGE UP (ref 11–32)
AMPHET UR-MCNC: NEGATIVE — SIGNIFICANT CHANGE UP
ANION GAP SERPL CALC-SCNC: 11 MMOL/L — SIGNIFICANT CHANGE UP (ref 5–17)
ANION GAP SERPL CALC-SCNC: 8 MMOL/L — SIGNIFICANT CHANGE UP (ref 5–17)
ANION GAP SERPL CALC-SCNC: 9 MMOL/L — SIGNIFICANT CHANGE UP (ref 5–17)
APPEARANCE UR: CLEAR — SIGNIFICANT CHANGE UP
APTT BLD: 24.4 SEC — LOW (ref 27.5–37.4)
APTT BLD: 26.6 SEC — LOW (ref 27.5–37.4)
AST SERPL-CCNC: 17 U/L — SIGNIFICANT CHANGE UP (ref 15–37)
AST SERPL-CCNC: 20 U/L — SIGNIFICANT CHANGE UP (ref 15–37)
AST SERPL-CCNC: 25 U/L — SIGNIFICANT CHANGE UP (ref 15–37)
BARBITURATES UR SCN-MCNC: NEGATIVE — SIGNIFICANT CHANGE UP
BASOPHILS # BLD AUTO: 0.1 K/UL — SIGNIFICANT CHANGE UP (ref 0–0.2)
BASOPHILS NFR BLD AUTO: 1 % — SIGNIFICANT CHANGE UP (ref 0–2)
BENZODIAZ UR-MCNC: NEGATIVE — SIGNIFICANT CHANGE UP
BILIRUB SERPL-MCNC: 0.6 MG/DL — SIGNIFICANT CHANGE UP (ref 0.2–1.2)
BILIRUB UR-MCNC: NEGATIVE — SIGNIFICANT CHANGE UP
BLD GP AB SCN SERPL QL: SIGNIFICANT CHANGE UP
BUN SERPL-MCNC: 15 MG/DL — SIGNIFICANT CHANGE UP (ref 7–23)
BUN SERPL-MCNC: 18 MG/DL — SIGNIFICANT CHANGE UP (ref 7–23)
BUN SERPL-MCNC: 27 MG/DL — HIGH (ref 7–23)
CALCIUM SERPL-MCNC: 7.7 MG/DL — LOW (ref 8.5–10.1)
CALCIUM SERPL-MCNC: 8.3 MG/DL — LOW (ref 8.5–10.1)
CALCIUM SERPL-MCNC: 8.8 MG/DL — SIGNIFICANT CHANGE UP (ref 8.5–10.1)
CHLORIDE SERPL-SCNC: 105 MMOL/L — SIGNIFICANT CHANGE UP (ref 96–108)
CHLORIDE SERPL-SCNC: 106 MMOL/L — SIGNIFICANT CHANGE UP (ref 96–108)
CHLORIDE SERPL-SCNC: 109 MMOL/L — HIGH (ref 96–108)
CO2 SERPL-SCNC: 24 MMOL/L — SIGNIFICANT CHANGE UP (ref 22–31)
CO2 SERPL-SCNC: 28 MMOL/L — SIGNIFICANT CHANGE UP (ref 22–31)
CO2 SERPL-SCNC: 29 MMOL/L — SIGNIFICANT CHANGE UP (ref 22–31)
COCAINE METAB.OTHER UR-MCNC: POSITIVE — SIGNIFICANT CHANGE UP
COLOR SPEC: YELLOW — SIGNIFICANT CHANGE UP
CREAT SERPL-MCNC: 0.78 MG/DL — SIGNIFICANT CHANGE UP (ref 0.5–1.3)
CREAT SERPL-MCNC: 0.82 MG/DL — SIGNIFICANT CHANGE UP (ref 0.5–1.3)
CREAT SERPL-MCNC: 1.66 MG/DL — HIGH (ref 0.5–1.3)
DIFF PNL FLD: ABNORMAL
EOSINOPHIL # BLD AUTO: 0.1 K/UL — SIGNIFICANT CHANGE UP (ref 0–0.5)
EOSINOPHIL NFR BLD AUTO: 0.7 % — SIGNIFICANT CHANGE UP (ref 0–6)
EPI CELLS # UR: SIGNIFICANT CHANGE UP
GLUCOSE BLDC GLUCOMTR-MCNC: 89 MG/DL — SIGNIFICANT CHANGE UP (ref 70–99)
GLUCOSE SERPL-MCNC: 109 MG/DL — HIGH (ref 70–99)
GLUCOSE SERPL-MCNC: 111 MG/DL — HIGH (ref 70–99)
GLUCOSE SERPL-MCNC: 119 MG/DL — HIGH (ref 70–99)
GLUCOSE UR QL: NEGATIVE MG/DL — SIGNIFICANT CHANGE UP
HAV IGM SER-ACNC: SIGNIFICANT CHANGE UP
HBV CORE IGM SER-ACNC: SIGNIFICANT CHANGE UP
HBV SURFACE AG SER-ACNC: SIGNIFICANT CHANGE UP
HCT VFR BLD CALC: 23.1 % — LOW (ref 34.5–45)
HCT VFR BLD CALC: 39.7 % — SIGNIFICANT CHANGE UP (ref 34.5–45)
HCT VFR BLD CALC: 40 % — SIGNIFICANT CHANGE UP (ref 34.5–45)
HCT VFR BLD CALC: 42.5 % — SIGNIFICANT CHANGE UP (ref 34.5–45)
HCT VFR BLD CALC: 45.7 % — HIGH (ref 34.5–45)
HCV AB S/CO SERPL IA: 0.12 S/CO — SIGNIFICANT CHANGE UP
HCV AB SERPL-IMP: SIGNIFICANT CHANGE UP
HGB BLD-MCNC: 12.9 G/DL — SIGNIFICANT CHANGE UP (ref 11.5–15.5)
HGB BLD-MCNC: 13 G/DL — SIGNIFICANT CHANGE UP (ref 11.5–15.5)
HGB BLD-MCNC: 13.9 G/DL — SIGNIFICANT CHANGE UP (ref 11.5–15.5)
HGB BLD-MCNC: 14.9 G/DL — SIGNIFICANT CHANGE UP (ref 11.5–15.5)
HGB BLD-MCNC: 7.8 G/DL — LOW (ref 11.5–15.5)
INR BLD: 0.98 RATIO — SIGNIFICANT CHANGE UP (ref 0.88–1.16)
INR BLD: 1.01 RATIO — SIGNIFICANT CHANGE UP (ref 0.88–1.16)
KETONES UR-MCNC: NEGATIVE — SIGNIFICANT CHANGE UP
LACTATE SERPL-SCNC: 1.1 MMOL/L — SIGNIFICANT CHANGE UP (ref 0.7–2)
LACTATE SERPL-SCNC: 1.8 MMOL/L — SIGNIFICANT CHANGE UP (ref 0.7–2)
LACTATE SERPL-SCNC: 3.5 MMOL/L — HIGH (ref 0.7–2)
LEUKOCYTE ESTERASE UR-ACNC: NEGATIVE — SIGNIFICANT CHANGE UP
LIDOCAIN IGE QN: 74 U/L — SIGNIFICANT CHANGE UP (ref 73–393)
LYMPHOCYTES # BLD AUTO: 1.5 K/UL — SIGNIFICANT CHANGE UP (ref 1–3.3)
LYMPHOCYTES # BLD AUTO: 18.9 % — SIGNIFICANT CHANGE UP (ref 13–44)
MAGNESIUM SERPL-MCNC: 2 MG/DL — SIGNIFICANT CHANGE UP (ref 1.6–2.6)
MCHC RBC-ENTMCNC: 29.8 PG — SIGNIFICANT CHANGE UP (ref 27–34)
MCHC RBC-ENTMCNC: 29.9 PG — SIGNIFICANT CHANGE UP (ref 27–34)
MCHC RBC-ENTMCNC: 30.2 PG — SIGNIFICANT CHANGE UP (ref 27–34)
MCHC RBC-ENTMCNC: 30.6 PG — SIGNIFICANT CHANGE UP (ref 27–34)
MCHC RBC-ENTMCNC: 30.7 PG — SIGNIFICANT CHANGE UP (ref 27–34)
MCHC RBC-ENTMCNC: 32.5 GM/DL — SIGNIFICANT CHANGE UP (ref 32–36)
MCHC RBC-ENTMCNC: 32.5 GM/DL — SIGNIFICANT CHANGE UP (ref 32–36)
MCHC RBC-ENTMCNC: 32.6 GM/DL — SIGNIFICANT CHANGE UP (ref 32–36)
MCHC RBC-ENTMCNC: 32.7 GM/DL — SIGNIFICANT CHANGE UP (ref 32–36)
MCHC RBC-ENTMCNC: 33.9 GM/DL — SIGNIFICANT CHANGE UP (ref 32–36)
MCV RBC AUTO: 90.6 FL — SIGNIFICANT CHANGE UP (ref 80–100)
MCV RBC AUTO: 91.8 FL — SIGNIFICANT CHANGE UP (ref 80–100)
MCV RBC AUTO: 91.9 FL — SIGNIFICANT CHANGE UP (ref 80–100)
MCV RBC AUTO: 92.4 FL — SIGNIFICANT CHANGE UP (ref 80–100)
MCV RBC AUTO: 94 FL — SIGNIFICANT CHANGE UP (ref 80–100)
METHADONE UR-MCNC: NEGATIVE — SIGNIFICANT CHANGE UP
MONOCYTES # BLD AUTO: 0.8 K/UL — SIGNIFICANT CHANGE UP (ref 0–0.9)
MONOCYTES NFR BLD AUTO: 10 % — SIGNIFICANT CHANGE UP (ref 2–14)
NEUTROPHILS # BLD AUTO: 5.5 K/UL — SIGNIFICANT CHANGE UP (ref 1.8–7.4)
NEUTROPHILS NFR BLD AUTO: 69.4 % — SIGNIFICANT CHANGE UP (ref 43–77)
NITRITE UR-MCNC: NEGATIVE — SIGNIFICANT CHANGE UP
OPIATES UR-MCNC: NEGATIVE — SIGNIFICANT CHANGE UP
PCP SPEC-MCNC: SIGNIFICANT CHANGE UP
PCP UR-MCNC: NEGATIVE — SIGNIFICANT CHANGE UP
PH UR: 8 — SIGNIFICANT CHANGE UP (ref 5–8)
PHOSPHATE SERPL-MCNC: 2.7 MG/DL — SIGNIFICANT CHANGE UP (ref 2.5–4.5)
PLATELET # BLD AUTO: 201 K/UL — SIGNIFICANT CHANGE UP (ref 150–400)
PLATELET # BLD AUTO: 218 K/UL — SIGNIFICANT CHANGE UP (ref 150–400)
PLATELET # BLD AUTO: 232 K/UL — SIGNIFICANT CHANGE UP (ref 150–400)
PLATELET # BLD AUTO: 237 K/UL — SIGNIFICANT CHANGE UP (ref 150–400)
PLATELET # BLD AUTO: 91 K/UL — LOW (ref 150–400)
POTASSIUM SERPL-MCNC: 3.1 MMOL/L — LOW (ref 3.5–5.3)
POTASSIUM SERPL-MCNC: 3.3 MMOL/L — LOW (ref 3.5–5.3)
POTASSIUM SERPL-MCNC: 4.2 MMOL/L — SIGNIFICANT CHANGE UP (ref 3.5–5.3)
POTASSIUM SERPL-SCNC: 3.1 MMOL/L — LOW (ref 3.5–5.3)
POTASSIUM SERPL-SCNC: 3.3 MMOL/L — LOW (ref 3.5–5.3)
POTASSIUM SERPL-SCNC: 4.2 MMOL/L — SIGNIFICANT CHANGE UP (ref 3.5–5.3)
PROT SERPL-MCNC: 5.1 GM/DL — LOW (ref 6–8.3)
PROT SERPL-MCNC: 7.3 GM/DL — SIGNIFICANT CHANGE UP (ref 6–8.3)
PROT SERPL-MCNC: 7.6 GM/DL — SIGNIFICANT CHANGE UP (ref 6–8.3)
PROT UR-MCNC: 15 MG/DL
PROTHROM AB SERPL-ACNC: 10.7 SEC — SIGNIFICANT CHANGE UP (ref 9.8–12.7)
PROTHROM AB SERPL-ACNC: 11 SEC — SIGNIFICANT CHANGE UP (ref 9.8–12.7)
RBC # BLD: 2.55 M/UL — LOW (ref 3.8–5.2)
RBC # BLD: 4.33 M/UL — SIGNIFICANT CHANGE UP (ref 3.8–5.2)
RBC # BLD: 4.35 M/UL — SIGNIFICANT CHANGE UP (ref 3.8–5.2)
RBC # BLD: 4.6 M/UL — SIGNIFICANT CHANGE UP (ref 3.8–5.2)
RBC # BLD: 4.87 M/UL — SIGNIFICANT CHANGE UP (ref 3.8–5.2)
RBC # FLD: 12.6 % — SIGNIFICANT CHANGE UP (ref 11–15)
RBC # FLD: 12.9 % — SIGNIFICANT CHANGE UP (ref 11–15)
RBC # FLD: 13.1 % — SIGNIFICANT CHANGE UP (ref 11–15)
RBC # FLD: 13.1 % — SIGNIFICANT CHANGE UP (ref 11–15)
RBC # FLD: 13.8 % — SIGNIFICANT CHANGE UP (ref 11–15)
SODIUM SERPL-SCNC: 142 MMOL/L — SIGNIFICANT CHANGE UP (ref 135–145)
SODIUM SERPL-SCNC: 143 MMOL/L — SIGNIFICANT CHANGE UP (ref 135–145)
SODIUM SERPL-SCNC: 144 MMOL/L — SIGNIFICANT CHANGE UP (ref 135–145)
SP GR SPEC: 1.01 — SIGNIFICANT CHANGE UP (ref 1.01–1.02)
THC UR QL: NEGATIVE — SIGNIFICANT CHANGE UP
TROPONIN I SERPL-MCNC: <.015 NG/ML — SIGNIFICANT CHANGE UP (ref 0.01–0.04)
UROBILINOGEN FLD QL: NEGATIVE MG/DL — SIGNIFICANT CHANGE UP
WBC # BLD: 18.4 K/UL — HIGH (ref 3.8–10.5)
WBC # BLD: 26.9 K/UL — HIGH (ref 3.8–10.5)
WBC # BLD: 29.3 K/UL — HIGH (ref 3.8–10.5)
WBC # BLD: 7.7 K/UL — SIGNIFICANT CHANGE UP (ref 3.8–10.5)
WBC # BLD: 7.9 K/UL — SIGNIFICANT CHANGE UP (ref 3.8–10.5)
WBC # FLD AUTO: 18.4 K/UL — HIGH (ref 3.8–10.5)
WBC # FLD AUTO: 26.9 K/UL — HIGH (ref 3.8–10.5)
WBC # FLD AUTO: 29.3 K/UL — HIGH (ref 3.8–10.5)
WBC # FLD AUTO: 7.7 K/UL — SIGNIFICANT CHANGE UP (ref 3.8–10.5)
WBC # FLD AUTO: 7.9 K/UL — SIGNIFICANT CHANGE UP (ref 3.8–10.5)

## 2017-11-04 PROCEDURE — 93010 ELECTROCARDIOGRAM REPORT: CPT

## 2017-11-04 PROCEDURE — 92950 HEART/LUNG RESUSCITATION CPR: CPT

## 2017-11-04 PROCEDURE — 99223 1ST HOSP IP/OBS HIGH 75: CPT

## 2017-11-04 PROCEDURE — 76700 US EXAM ABDOM COMPLETE: CPT | Mod: 26

## 2017-11-04 PROCEDURE — 88307 TISSUE EXAM BY PATHOLOGIST: CPT | Mod: 26

## 2017-11-04 PROCEDURE — 99291 CRITICAL CARE FIRST HOUR: CPT | Mod: 25

## 2017-11-04 PROCEDURE — 74178 CT ABD&PLV WO CNTR FLWD CNTR: CPT | Mod: 26

## 2017-11-04 PROCEDURE — 36620 INSERTION CATHETER ARTERY: CPT

## 2017-11-04 PROCEDURE — 71010: CPT | Mod: 26

## 2017-11-04 PROCEDURE — 74176 CT ABD & PELVIS W/O CONTRAST: CPT | Mod: 26,59

## 2017-11-04 PROCEDURE — 99292 CRITICAL CARE ADDL 30 MIN: CPT | Mod: 25

## 2017-11-04 RX ORDER — POTASSIUM CHLORIDE 20 MEQ
10 PACKET (EA) ORAL
Qty: 0 | Refills: 0 | Status: COMPLETED | OUTPATIENT
Start: 2017-11-04 | End: 2017-11-04

## 2017-11-04 RX ORDER — MOMETASONE FUROATE 220 UG/1
2 INHALANT RESPIRATORY (INHALATION)
Qty: 0 | Refills: 0 | Status: DISCONTINUED | OUTPATIENT
Start: 2017-11-04 | End: 2017-11-04

## 2017-11-04 RX ORDER — DARUNAVIR 75 MG/1
800 TABLET, FILM COATED ORAL DAILY
Qty: 0 | Refills: 0 | Status: DISCONTINUED | OUTPATIENT
Start: 2017-11-04 | End: 2017-11-04

## 2017-11-04 RX ORDER — POTASSIUM PHOSPHATE, MONOBASIC POTASSIUM PHOSPHATE, DIBASIC 236; 224 MG/ML; MG/ML
15 INJECTION, SOLUTION INTRAVENOUS ONCE
Qty: 0 | Refills: 0 | Status: DISCONTINUED | OUTPATIENT
Start: 2017-11-04 | End: 2017-11-04

## 2017-11-04 RX ORDER — ALBUTEROL 90 UG/1
2 AEROSOL, METERED ORAL EVERY 6 HOURS
Qty: 0 | Refills: 0 | Status: DISCONTINUED | OUTPATIENT
Start: 2017-11-04 | End: 2017-11-04

## 2017-11-04 RX ORDER — THIAMINE MONONITRATE (VIT B1) 100 MG
100 TABLET ORAL DAILY
Qty: 0 | Refills: 0 | Status: DISCONTINUED | OUTPATIENT
Start: 2017-11-04 | End: 2017-11-04

## 2017-11-04 RX ORDER — RALTEGRAVIR 400 MG/1
400 TABLET, FILM COATED ORAL
Qty: 0 | Refills: 0 | Status: DISCONTINUED | OUTPATIENT
Start: 2017-11-04 | End: 2017-11-04

## 2017-11-04 RX ORDER — RITONAVIR 100 MG/1
100 TABLET, FILM COATED ORAL DAILY
Qty: 0 | Refills: 0 | Status: DISCONTINUED | OUTPATIENT
Start: 2017-11-04 | End: 2017-11-04

## 2017-11-04 RX ORDER — TIOTROPIUM BROMIDE 18 UG/1
1 CAPSULE ORAL; RESPIRATORY (INHALATION) DAILY
Qty: 0 | Refills: 0 | Status: DISCONTINUED | OUTPATIENT
Start: 2017-11-04 | End: 2017-11-04

## 2017-11-04 RX ORDER — MORPHINE SULFATE 50 MG/1
4 CAPSULE, EXTENDED RELEASE ORAL ONCE
Qty: 0 | Refills: 0 | Status: DISCONTINUED | OUTPATIENT
Start: 2017-11-04 | End: 2017-11-04

## 2017-11-04 RX ORDER — POTASSIUM CHLORIDE 20 MEQ
20 PACKET (EA) ORAL ONCE
Qty: 0 | Refills: 0 | Status: COMPLETED | OUTPATIENT
Start: 2017-11-04 | End: 2017-11-04

## 2017-11-04 RX ORDER — FAMOTIDINE 10 MG/ML
10 INJECTION INTRAVENOUS
Qty: 0 | Refills: 0 | Status: DISCONTINUED | OUTPATIENT
Start: 2017-11-04 | End: 2017-11-04

## 2017-11-04 RX ORDER — INFLUENZA VIRUS VACCINE 15; 15; 15; 15 UG/.5ML; UG/.5ML; UG/.5ML; UG/.5ML
0.5 SUSPENSION INTRAMUSCULAR ONCE
Qty: 0 | Refills: 0 | Status: DISCONTINUED | OUTPATIENT
Start: 2017-11-04 | End: 2017-11-06

## 2017-11-04 RX ORDER — SODIUM CHLORIDE 9 MG/ML
1000 INJECTION INTRAMUSCULAR; INTRAVENOUS; SUBCUTANEOUS ONCE
Qty: 0 | Refills: 0 | Status: COMPLETED | OUTPATIENT
Start: 2017-11-04 | End: 2017-11-04

## 2017-11-04 RX ORDER — FAMOTIDINE 10 MG/ML
20 INJECTION INTRAVENOUS DAILY
Qty: 0 | Refills: 0 | Status: DISCONTINUED | OUTPATIENT
Start: 2017-11-04 | End: 2017-11-04

## 2017-11-04 RX ORDER — SODIUM CHLORIDE 9 MG/ML
2000 INJECTION INTRAMUSCULAR; INTRAVENOUS; SUBCUTANEOUS ONCE
Qty: 0 | Refills: 0 | Status: DISCONTINUED | OUTPATIENT
Start: 2017-11-04 | End: 2017-11-04

## 2017-11-04 RX ORDER — SODIUM CHLORIDE 9 MG/ML
1000 INJECTION, SOLUTION INTRAVENOUS
Qty: 0 | Refills: 0 | Status: DISCONTINUED | OUTPATIENT
Start: 2017-11-04 | End: 2017-11-04

## 2017-11-04 RX ORDER — PIPERACILLIN AND TAZOBACTAM 4; .5 G/20ML; G/20ML
3.38 INJECTION, POWDER, LYOPHILIZED, FOR SOLUTION INTRAVENOUS EVERY 8 HOURS
Qty: 0 | Refills: 0 | Status: DISCONTINUED | OUTPATIENT
Start: 2017-11-04 | End: 2017-11-04

## 2017-11-04 RX ORDER — PANTOPRAZOLE SODIUM 20 MG/1
8 TABLET, DELAYED RELEASE ORAL
Qty: 80 | Refills: 0 | Status: DISCONTINUED | OUTPATIENT
Start: 2017-11-04 | End: 2017-11-04

## 2017-11-04 RX ORDER — PIPERACILLIN AND TAZOBACTAM 4; .5 G/20ML; G/20ML
3.38 INJECTION, POWDER, LYOPHILIZED, FOR SOLUTION INTRAVENOUS ONCE
Qty: 0 | Refills: 0 | Status: COMPLETED | OUTPATIENT
Start: 2017-11-04 | End: 2017-11-04

## 2017-11-04 RX ORDER — FOLIC ACID 0.8 MG
1 TABLET ORAL DAILY
Qty: 0 | Refills: 0 | Status: DISCONTINUED | OUTPATIENT
Start: 2017-11-04 | End: 2017-11-04

## 2017-11-04 RX ADMIN — IOHEXOL 30 MILLILITER(S): 300 INJECTION, SOLUTION INTRAVENOUS at 00:08

## 2017-11-04 RX ADMIN — PIPERACILLIN AND TAZOBACTAM 200 GRAM(S): 4; .5 INJECTION, POWDER, LYOPHILIZED, FOR SOLUTION INTRAVENOUS at 03:24

## 2017-11-04 RX ADMIN — Medication 100 MILLIEQUIVALENT(S): at 07:21

## 2017-11-04 RX ADMIN — MORPHINE SULFATE 4 MILLIGRAM(S): 50 CAPSULE, EXTENDED RELEASE ORAL at 05:17

## 2017-11-04 RX ADMIN — MORPHINE SULFATE 4 MILLIGRAM(S): 50 CAPSULE, EXTENDED RELEASE ORAL at 04:16

## 2017-11-04 RX ADMIN — Medication 100 MILLIGRAM(S): at 12:23

## 2017-11-04 RX ADMIN — ALBUTEROL 2 PUFF(S): 90 AEROSOL, METERED ORAL at 12:24

## 2017-11-04 RX ADMIN — MORPHINE SULFATE 4 MILLIGRAM(S): 50 CAPSULE, EXTENDED RELEASE ORAL at 00:08

## 2017-11-04 RX ADMIN — MORPHINE SULFATE 4 MILLIGRAM(S): 50 CAPSULE, EXTENDED RELEASE ORAL at 09:35

## 2017-11-04 RX ADMIN — SODIUM CHLORIDE 2000 MILLILITER(S): 9 INJECTION INTRAMUSCULAR; INTRAVENOUS; SUBCUTANEOUS at 03:27

## 2017-11-04 RX ADMIN — ALBUTEROL 2 PUFF(S): 90 AEROSOL, METERED ORAL at 18:31

## 2017-11-04 RX ADMIN — SODIUM CHLORIDE 100 MILLILITER(S): 9 INJECTION, SOLUTION INTRAVENOUS at 20:47

## 2017-11-04 RX ADMIN — RALTEGRAVIR 400 MILLIGRAM(S): 400 TABLET, FILM COATED ORAL at 06:31

## 2017-11-04 RX ADMIN — PIPERACILLIN AND TAZOBACTAM 25 GRAM(S): 4; .5 INJECTION, POWDER, LYOPHILIZED, FOR SOLUTION INTRAVENOUS at 11:27

## 2017-11-04 RX ADMIN — ONDANSETRON 4 MILLIGRAM(S): 8 TABLET, FILM COATED ORAL at 00:08

## 2017-11-04 RX ADMIN — Medication 1 MILLIGRAM(S): at 12:23

## 2017-11-04 RX ADMIN — MORPHINE SULFATE 4 MILLIGRAM(S): 50 CAPSULE, EXTENDED RELEASE ORAL at 01:38

## 2017-11-04 RX ADMIN — MORPHINE SULFATE 4 MILLIGRAM(S): 50 CAPSULE, EXTENDED RELEASE ORAL at 10:35

## 2017-11-04 RX ADMIN — PANTOPRAZOLE SODIUM 10 MG/HR: 20 TABLET, DELAYED RELEASE ORAL at 17:30

## 2017-11-04 RX ADMIN — PIPERACILLIN AND TAZOBACTAM 25 GRAM(S): 4; .5 INJECTION, POWDER, LYOPHILIZED, FOR SOLUTION INTRAVENOUS at 20:47

## 2017-11-04 RX ADMIN — RITONAVIR 100 MILLIGRAM(S): 100 TABLET, FILM COATED ORAL at 12:23

## 2017-11-04 RX ADMIN — MOMETASONE FUROATE 2 PUFF(S): 220 INHALANT RESPIRATORY (INHALATION) at 18:32

## 2017-11-04 RX ADMIN — Medication 20 MILLIEQUIVALENT(S): at 09:35

## 2017-11-04 RX ADMIN — DARUNAVIR 800 MILLIGRAM(S): 75 TABLET, FILM COATED ORAL at 12:23

## 2017-11-04 RX ADMIN — TIOTROPIUM BROMIDE 1 CAPSULE(S): 18 CAPSULE ORAL; RESPIRATORY (INHALATION) at 12:23

## 2017-11-04 RX ADMIN — SODIUM CHLORIDE 75 MILLILITER(S): 9 INJECTION, SOLUTION INTRAVENOUS at 07:20

## 2017-11-04 RX ADMIN — Medication 100 MILLIEQUIVALENT(S): at 09:39

## 2017-11-04 RX ADMIN — Medication 100 MILLIEQUIVALENT(S): at 11:33

## 2017-11-04 RX ADMIN — FAMOTIDINE 20 MILLIGRAM(S): 10 INJECTION INTRAVENOUS at 12:23

## 2017-11-04 NOTE — H&P ADULT - ATTENDING COMMENTS
I saw and examined the pt and discussed the tx plan with the House Staff. I agree with the exam and plan as documented in the above H&P with additions below.  + epigastric pain and back pain. Feels the same.  Used crack last yesterday.  Very drowsy. Cachectic.   Abdomen soft, mildly distended, with epigastric tenderness.  I reviewed the CT with Dr Bharath Thomason (Radiologist on call at [facility]), the stomach and duodenum appear nl, there is contrast in the stomach, there is free air bubbles and free fluid limited in the pelvis, possible etiology could be diverticulitis based on location of findings, but no obvious diverticulitis seen. The location of her pain alludes to possible perforated PUD, but CT does not support. She gives h/o recent prednisone tx, unknown indication.   Pt with abd pain and pelvic free air and fluid.   Will obtain delayed CT cuts for further evaluation.  Rebekah Michaud MD

## 2017-11-04 NOTE — H&P ADULT - NSHPSOCIALHISTORY_GEN_ALL_CORE
Unknown marital, living status.  Quit smoking cigarettes in Feb 2017.  Drinks 2-3 wine coolers/day. Last drank yesterday.  Crack cocaine every other day. Last used yesterday.

## 2017-11-04 NOTE — CONSULT NOTE ADULT - SUBJECTIVE AND OBJECTIVE BOX
Patient is a 57y old  Female who presents with a chief complaint of Abdominal and Back Pain (2017 04:41)      HPI:  57F presented with abdominal and back pain which started earlier today. Per ED, she was nauseated and vomiting at presentation. Now states that she has severe back pain (was pacing at the foot of the bed when I entered the room). No HA, CP, SOB. Nauseated. No dysuria, normal BM. (2017 04:41)      Allergies    No Known Allergies    Intolerances        MEDICATIONS  (STANDING):  ALBUTerol/ipratropium for Nebulization 3 milliLiter(s) Nebulizer every 6 hours  chlorhexidine 0.12% Liquid 15 milliLiter(s) Swish and Spit two times a day  influenza   Vaccine 0.5 milliLiter(s) IntraMuscular once  norepinephrine Infusion 0.1 MICROgram(s)/kG/Min (4.331 mL/Hr) IV Continuous <Continuous>  pantoprazole  Injectable 40 milliGRAM(s) IV Push daily  phenylephrine    Infusion 0.4 MICROgram(s)/kG/Min (13.335 mL/Hr) IV Continuous <Continuous>  piperacillin/tazobactam IVPB. 3.375 Gram(s) IV Intermittent every 8 hours  sodium bicarbonate  Infusion 0.487 mEq/kG/Hr (150 mL/Hr) IV Continuous <Continuous>  vancomycin  IVPB 1000 milliGRAM(s) IV Intermittent every 24 hours  vasopressin Infusion 0.04 Unit(s)/Min (2.4 mL/Hr) IV Continuous <Continuous>    MEDICATIONS  (PRN):      Daily     Daily Weight in k.2 (2017 01:40)    Drug Dosing Weight  Height (cm): 162.56 (2017 20:44)  Weight (kg): 46.2 (2017 01:40)  BMI (kg/m2): 17.5 (2017 01:40)  BSA (m2): 1.47 (2017 01:40)    PAST MEDICAL & SURGICAL HISTORY:  Cocaine abuse  Asthma  HIV (human immunodeficiency virus infection)  H/O:   S/P appendectomy      FAMILY HISTORY:  Family history of drug abuse (Mother)      SOCIAL HISTORY:    ADVANCE DIRECTIVES:    REVIEW OF SYSTEMS:    CONSTITUTIONAL: No fever, weight loss, or fatigue  EYES: No eye pain, visual disturbances, or discharge  ENMT:  No difficulty hearing, tinnitus, vertigo; No sinus or throat pain  NECK: No pain or stiffness  BREASTS: No pain, masses, or nipple discharge  RESPIRATORY: No cough, wheezing, chills or hemoptysis; No shortness of breath  CARDIOVASCULAR: No chest pain, palpitations, dizziness, or leg swelling  GASTROINTESTINAL: No abdominal or epigastric pain. No nausea, vomiting, or hematemesis; No diarrhea or constipation. No melena or hematochezia.  GENITOURINARY: No dysuria, frequency, hematuria, or incontinence  NEUROLOGICAL: No headaches, memory loss, loss of strength, numbness, or tremors  SKIN: No itching, burning, rashes, or lesions   LYMPH NODES: No enlarged glands  ENDOCRINE: No heat or cold intolerance; No hair loss  MUSCULOSKELETAL: No joint pain or swelling; No muscle, back, or extremity pain  PSYCHIATRIC: No depression, anxiety, mood swings, or difficulty sleeping  HEME/LYMPH: No easy bruising, or bleeding gums  ALLERGY AND IMMUNOLOGIC: No hives or eczema      Mode: AC/ CMV (Assist Control/ Continuous Mandatory Ventilation)  RR (machine): 24  TV (machine): 400  FiO2: 30  PEEP: 5  ITime: 1  MAP: 18  PIP: 37      ICU Vital Signs Last 24 Hrs  T(C): 35.4 (2017 20:00), Max: 37.8 (2017 15:00)  T(F): 95.8 (2017 20:00), Max: 100.1 (2017 15:00)  HR: 107 (2017 22:06) (94 - 123)  BP: 40/16 (2017 21:00) (31/16 - 82/47)  BP(mean): 22 (2017 21:00) (19 - 56)  ABP: 118/64 (2017 22:00) (78/50 - 200/92)  ABP(mean): 76 (2017 22:00) (53 - 237)  RR: 24 (2017 22:00) (12 - 24)  SpO2: 100% (2017 17:00) (92% - 100%)      ABG - ( 2017 15:00 )  pH: x     /  pCO2: 35    /  pO2: 142   / HCO3: 12    / Base Excess: -15.0 /  SaO2: 98                  I&O's Detail    2017 08:01  -  2017 07:00  --------------------------------------------------------  IN:    IV PiggyBack: 100 mL    lactated ringers.: 700 mL    norepinephrine Infusion: 2251 mL    phenylephrine   Infusion: 840 mL    Solution: 250 mL    vasopressin Infusion: 12 mL  Total IN: 4153 mL    OUT:    Indwelling Catheter - Urethral: 80 mL    Nasoenteral Tube: 50 mL  Total OUT: 130 mL    Total NET: 4023 mL      2017 07:01  -  2017 22:58  --------------------------------------------------------  IN:    IV PiggyBack: 400 mL    lactated ringers.: 200 mL    norepinephrine Infusion: 194.6 mL    norepinephrine Infusion: 1168.6 mL    phenylephrine   Infusion: 1200.1 mL    sodium bicarbonate  Infusion: 1200 mL    sodium bicarbonate  Infusion: 500 mL    Sodium Chloride 0.9% IV Bolus: 1000 mL    vasopressin Infusion: 36 mL  Total IN: 5899.3 mL    OUT:    Indwelling Catheter - Urethral: 10 mL    Nasoenteral Tube: 450 mL  Total OUT: 460 mL    Total NET: 5439.3 mL          PHYSICAL EXAM:    GENERAL: NAD, well-groomed, well-developed  HEAD:  Atraumatic, Normocephalic  EYES: EOMI, PERRLA, conjunctiva and sclera clear  ENMT: No tonsillar erythema, exudates, or enlargement; Moist mucous membranes, Good dentition, No lesions  NECK: Supple, No JVD, Normal thyroid  NERVOUS SYSTEM:  Alert & Oriented X3, Good concentration; Motor Strength 5/5 B/L upper and lower extremities; DTRs 2+ intact and symmetric  CHEST/LUNG: Clear to percussion bilaterally; No rales, rhonchi, wheezing, or rubs  HEART: Regular rate and rhythm; No murmurs, rubs, or gallops  ABDOMEN: Soft, Nontender, Nondistended; Bowel sounds present  EXTREMITIES:  2+ Peripheral Pulses, No clubbing, cyanosis, or edema  LYMPH: No lymphadenopathy noted  SKIN: No rashes or lesions    LABS:  CBC Full  -  ( 2017 05:13 )  WBC Count : 37.0 K/uL  Hemoglobin : 7.8 g/dL  Hematocrit : 24.8 %  Platelet Count - Automated : 70 K/uL  Mean Cell Volume : 97.2 fl  Mean Cell Hemoglobin : 30.4 pg  Mean Cell Hemoglobin Concentration : 31.2 gm/dL  Auto Neutrophil # : x  Auto Lymphocyte # : x  Auto Monocyte # : x  Auto Eosinophil # : x  Auto Basophil # : x  Auto Neutrophil % : x  Auto Lymphocyte % : x  Auto Monocyte % : x  Auto Eosinophil % : x  Auto Basophil % : x    1105    151<H>  |  115<H>  |  28<H>  ----------------------------<  118<H>  5.1   |  14<L>  |  2.95<H>    Ca    6.3<LL>      2017 15:13  Phos  7.7     11  Mg     2.5         TPro  2.5<L>  /  Alb  1.2<L>  /  TBili  2.0<H>  /  DBili  x   /  AST  5863<H>  /  ALT  3599<H>  /  AlkPhos  67  11-05    CAPILLARY BLOOD GLUCOSE      POCT Blood Glucose.: 70 mg/dL (2017 21:24)    PT/INR - ( 2017 15:13 )   PT: 51.5 sec;   INR: 4.58 ratio         PTT - ( 2017 15:13 )  PTT:58.2 sec  Urinalysis Basic - ( 2017 00:18 )    Color: Yellow / Appearance: Clear / S.015 / pH: x  Gluc: x / Ketone: Negative  / Bili: Negative / Urobili: Negative mg/dL   Blood: x / Protein: 15 mg/dL / Nitrite: Negative   Leuk Esterase: Negative / RBC: x / WBC x   Sq Epi: x / Non Sq Epi: Occasional / Bacteria: x      CARDIAC MARKERS ( 2017 15:13 )  131.000 ng/mL / x     / 2706 U/L / x     / x      CARDIAC MARKERS ( 2017 05:13 )  36.400 ng/mL / x     / 1668 U/L / x     / x      CARDIAC MARKERS ( 2017 00:18 )  <.015 ng/mL / x     / x     / x     / x              EKG:    ECHO, US:    RADIOLOGY:    CRITICAL CARE TIME SPENT: Patient is a 57y old  Female who presents with a chief complaint of Abdominal and Back Pain (2017 04:41)      HPI:  57F presented with abdominal and back pain which started earlier today. Per ED, she was nauseated and vomiting at presentation. Now states that she has severe back pain (was pacing at the foot of the bed when I entered the room). No HA, CP, SOB. Nauseated. No dysuria, normal BM. (2017 04:41)      Allergies    No Known Allergies    Intolerances        MEDICATIONS  (STANDING):  ALBUTerol/ipratropium for Nebulization 3 milliLiter(s) Nebulizer every 6 hours  chlorhexidine 0.12% Liquid 15 milliLiter(s) Swish and Spit two times a day  influenza   Vaccine 0.5 milliLiter(s) IntraMuscular once  norepinephrine Infusion 0.1 MICROgram(s)/kG/Min (4.331 mL/Hr) IV Continuous <Continuous>  pantoprazole  Injectable 40 milliGRAM(s) IV Push daily  phenylephrine    Infusion 0.4 MICROgram(s)/kG/Min (13.335 mL/Hr) IV Continuous <Continuous>  piperacillin/tazobactam IVPB. 3.375 Gram(s) IV Intermittent every 8 hours  sodium bicarbonate  Infusion 0.487 mEq/kG/Hr (150 mL/Hr) IV Continuous <Continuous>  vancomycin  IVPB 1000 milliGRAM(s) IV Intermittent every 24 hours  vasopressin Infusion 0.04 Unit(s)/Min (2.4 mL/Hr) IV Continuous <Continuous>    MEDICATIONS  (PRN):      Daily     Daily Weight in k.2 (2017 01:40)    Drug Dosing Weight  Height (cm): 162.56 (2017 20:44)  Weight (kg): 46.2 (2017 01:40)  BMI (kg/m2): 17.5 (2017 01:40)  BSA (m2): 1.47 (2017 01:40)    PAST MEDICAL & SURGICAL HISTORY:  Cocaine abuse  Asthma  HIV (human immunodeficiency virus infection)  H/O:   S/P appendectomy      FAMILY HISTORY:  Family history of drug abuse (Mother)      SOCIAL HISTORY:    ADVANCE DIRECTIVES:    REVIEW OF SYSTEMS:    CONSTITUTIONAL: No fever, weight loss, or fatigue  EYES: No eye pain, visual disturbances, or discharge  ENMT:  No difficulty hearing, tinnitus, vertigo; No sinus or throat pain  NECK: No pain or stiffness  BREASTS: No pain, masses, or nipple discharge  RESPIRATORY: No cough, wheezing, chills or hemoptysis; No shortness of breath  CARDIOVASCULAR: No chest pain, palpitations, dizziness, or leg swelling  GASTROINTESTINAL: No abdominal or epigastric pain. No nausea, vomiting, or hematemesis; No diarrhea or constipation. No melena or hematochezia.  GENITOURINARY: No dysuria, frequency, hematuria, or incontinence  NEUROLOGICAL: No headaches, memory loss, loss of strength, numbness, or tremors  SKIN: No itching, burning, rashes, or lesions   LYMPH NODES: No enlarged glands  ENDOCRINE: No heat or cold intolerance; No hair loss  MUSCULOSKELETAL: No joint pain or swelling; No muscle, back, or extremity pain  PSYCHIATRIC: No depression, anxiety, mood swings, or difficulty sleeping  HEME/LYMPH: No easy bruising, or bleeding gums  ALLERGY AND IMMUNOLOGIC: No hives or eczema      Vital Signs Last 24 Hrs  T(F): 97 (17 @ 17:54), Max: 98 (17 @ 22:11)  HR: 82 (17 @ 17:54) (63 - 110)  BP: 112/70 (17 @ 17:54) (84/63 - 158/102)  RR: 16 (17 @ 17:54) (16 - 20)  SpO2: 97% (17 @ 17:54) (96% - 100%)      POCT Blood Glucose.: 89 mg/dL (2017 16:56)           PHYSICAL EXAM:    GENERAL: NAD, well-groomed, well-developed  HEAD:  Atraumatic, Normocephalic  EYES: EOMI, PERRLA, conjunctiva and sclera clear  ENMT: No tonsillar erythema, exudates, or enlargement; Moist mucous membranes, Good dentition, No lesions  NECK: Supple, No JVD, Normal thyroid  NERVOUS SYSTEM:  Alert & Oriented X3, Good concentration; Motor Strength 5/5 B/L upper and lower extremities; DTRs 2+ intact and symmetric  CHEST/LUNG: Clear to percussion bilaterally; No rales, rhonchi, wheezing, or rubs  HEART: Regular rate and rhythm; No murmurs, rubs, or gallops  ABDOMEN: Soft, Nontender, Nondistended; Bowel sounds present  EXTREMITIES:  2+ Peripheral Pulses, No clubbing, cyanosis, or edema  LYMPH: No lymphadenopathy noted  SKIN: No rashes or lesions    LABS:                       14.9   26.9  )-----------( 201      ( 2017 17:23 )             45.7     11-04    144  |  109<H>  |  27<H>  ----------------------------<  109<H>  4.2   |  24  |  1.66<H>    Ca    7.7<L>      2017 17:23  Phos  2.7     11-  Mg     2.0     -    TPro  5.1<L>  /  Alb  2.6<L>  /  TBili  0.6  /  DBili  x   /  AST  17  /  ALT  24  /  AlkPhos  58  11-04    PT/INR - ( 2017 17:23 )   PT: 11.0 sec;   INR: 1.01 ratio    PTT - ( 2017 17:23 )  PTT:24.4 sec              EKG:    ECHO, US:    RADIOLOGY:    CRITICAL CARE TIME SPENT: Patient is a 57y old  Female who presents with a chief complaint of Abdominal and Back Pain (2017 04:41)      HPI:  57F presented with abdominal and back pain which started earlier today. Per ED, she was nauseated and vomiting at presentation. Now states that she has severe back pain (was pacing at the foot of the bed when I entered the room). No HA, CP, SOB. Nauseated. No dysuria, normal BM. (2017 04:41)      Allergies: No Known Allergies    MEDICATIONS  (STANDING):  zosyn  raltegavir  darunavir  ritonavir  albuterol 2 puff q6  pepcid  folic acid  thiamine      PAST MEDICAL & SURGICAL HISTORY:  Cocaine abuse  alcohol abuse ("three   Asthma  HIV (human immunodeficiency virus infection)  H/O:   S/P appendectomy      FAMILY HISTORY:  Family history of drug abuse (Mother)      SOCIAL HISTORY:    ADVANCE DIRECTIVES:    REVIEW OF SYSTEMS:    CONSTITUTIONAL: No fever, weight loss, or fatigue  EYES: No eye pain, visual disturbances, or discharge  ENMT:  No difficulty hearing, tinnitus, vertigo; No sinus or throat pain  NECK: No pain or stiffness  BREASTS: No pain, masses, or nipple discharge  RESPIRATORY: No cough, wheezing, chills or hemoptysis; No shortness of breath  CARDIOVASCULAR: No chest pain, palpitations, dizziness, or leg swelling  GASTROINTESTINAL: No abdominal or epigastric pain. No nausea, vomiting, or hematemesis; No diarrhea or constipation. No melena or hematochezia.  GENITOURINARY: No dysuria, frequency, hematuria, or incontinence  NEUROLOGICAL: No headaches, memory loss, loss of strength, numbness, or tremors  SKIN: No itching, burning, rashes, or lesions   LYMPH NODES: No enlarged glands  ENDOCRINE: No heat or cold intolerance; No hair loss  MUSCULOSKELETAL: No joint pain or swelling; No muscle, back, or extremity pain  PSYCHIATRIC: No depression, anxiety, mood swings, or difficulty sleeping  HEME/LYMPH: No easy bruising, or bleeding gums  ALLERGY AND IMMUNOLOGIC: No hives or eczema      Vital Signs Last 24 Hrs  T(F): 97 (17 @ 17:54), Max: 98 (17 @ 22:11)  HR: 82 (17 @ 17:54) (63 - 110)  BP: 112/70 (17 @ 17:54) (84/63 - 158/102)  RR: 16 (17 @ 17:54) (16 - 20)  SpO2: 97% (17 @ 17:54) (96% - 100%)      POCT Blood Glucose.: 89 mg/dL (2017 16:56)           PHYSICAL EXAM:    GENERAL: NAD, well-groomed, well-developed  HEAD:  Atraumatic, Normocephalic  EYES: EOMI, PERRLA, conjunctiva and sclera clear  ENMT: No tonsillar erythema, exudates, or enlargement; Moist mucous membranes, Good dentition, No lesions  NECK: Supple, No JVD, Normal thyroid  NERVOUS SYSTEM:  Alert & Oriented X3, Good concentration; Motor Strength 5/5 B/L upper and lower extremities; DTRs 2+ intact and symmetric  CHEST/LUNG: Clear to percussion bilaterally; No rales, rhonchi, wheezing, or rubs  HEART: Regular rate and rhythm; No murmurs, rubs, or gallops  ABDOMEN: Soft, Nontender, Nondistended; Bowel sounds present  EXTREMITIES:  2+ Peripheral Pulses, No clubbing, cyanosis, or edema  LYMPH: No lymphadenopathy noted  SKIN: No rashes or lesions    LABS:                       14.9   26.9  )-----------( 201      ( 2017 17:23 )             45.7         144  |  109<H>  |  27<H>  ----------------------------<  109<H>  4.2   |  24  |  1.66<H>    Ca    7.7<L>      2017 17:23  Phos  2.7       Mg     2.0         TPro  5.1<L>  /  Alb  2.6<L>  /  TBili  0.6  /  DBili  x   /  AST  17  /  ALT  24  /  AlkPhos  58  -    PT/INR - ( 2017 17:23 )   PT: 11.0 sec;   INR: 1.01 ratio    PTT - ( 2017 17:23 )  PTT:24.4 sec              EKG:    ECHO, US:    RADIOLOGY:    CRITICAL CARE TIME SPENT: Patient is a 57y old  Female who presents with a chief complaint of Abdominal and Back Pain (2017 04:41)      HPI:  57F presented with abdominal and back pain which started earlier today. Per ED, she was nauseated and vomiting at presentation. Now states that she has severe back pain (was pacing at the foot of the bed when I entered the room). No HA, CP, SOB. Nauseated. No dysuria, normal BM. (2017 04:41)      Allergies: No Known Allergies    MEDICATIONS  (STANDING):  zosyn  raltegavir  darunavir  ritonavir  albuterol 2 puff q6  pepcid  folic acid  thiamine      PAST MEDICAL & SURGICAL HISTORY:  Cocaine abuse  alcohol abuse ("three   Asthma  HIV (human immunodeficiency virus infection)  H/O:   S/P appendectomy      FAMILY HISTORY:  Family history of drug abuse (Mother)      SOCIAL HISTORY:  active cocaine/crack use  alcohol abuse (daily three 16oz wine coolers)     ADVANCE DIRECTIVES:  full code    REVIEW OF SYSTEMS:  CONSTITUTIONAL: No fever, no chills  ENMT:  No throat pain  BREASTS: No pain, masses, or nipple discharge  RESPIRATORY: No cough, wheezing; No shortness of breath  CARDIOVASCULAR: No chest pain, palpitations  GASTROINTESTINAL: + nausea + abdominal pain +emesis (coffee ground)  GENITOURINARY: No dysuria  NEUROLOGICAL: No headaches    ROS otherwise negative    Vital Signs Last 24 Hrs  T(F): 97 (17 @ 17:54), Max: 98 (17 @ 22:11)  HR: 82 (17 @ 17:54) (63 - 110)  BP: 112/70 (17 @ 17:54) (84/63 - 158/102)  RR: 16 (17 @ 17:54) (16 - 20)  SpO2: 97% (17 @ 17:54) (96% - 100%)      POCT Blood Glucose.: 89 mg/dL (2017 16:56)      PHYSICAL EXAM:    GENERAL: thin frail female, cachectic  HEAD:  Atraumatic, Normocephalic  EYES: EOMI, PERRLA, conjunctiva and sclera clear  ENMT: dry oral mucosa  NECK: Supple, No JVD, Normal thyroid  NERVOUS SYSTEM:  mildly lethargic, moving all extremities, oriented to person/place/time  CHEST/LUNG: Clear to percussion bilaterally; No rales, rhonchi, wheezing, or rubs  HEART: Regular rate and rhythm; No murmurs, rubs, or gallops  ABDOMEN: Soft, Nontender, Nondistended; Bowel sounds present  EXTREMITIES:  2+ Peripheral Pulses, No clubbing, cyanosis, or edema  LYMPH: No lymphadenopathy noted  SKIN: No rashes or lesions    LABS:                       14.9   26.9  )-----------( 201      ( 2017 17:23 )             45.7     11-04    144  |  109<H>  |  27<H>  ----------------------------<  109<H>  4.2   |  24  |  1.66<H>    Ca    7.7<L>      2017 17:23  Phos  2.7     11-04  Mg     2.0     11-04    TPro  5.1<L>  /  Alb  2.6<L>  /  TBili  0.6  /  DBili  x   /  AST  17  /  ALT  24  /  AlkPhos  58  11-04    PT/INR - ( 2017 17:23 )   PT: 11.0 sec;   INR: 1.01 ratio    PTT - ( 2017 17:23 )  PTT:24.4 sec              EKG:    ECHO, US:    RADIOLOGY:    CRITICAL CARE TIME SPENT: Patient is a 57y old  Female who presents with a chief complaint of Abdominal and Back Pain (2017 04:41)      HPI:  57F PMH HIV, active crack cocaine use, alcohol abuse, asthma, COPD on home O2 presents with abdominal and back pain Pt with nausea and emesis x2 days. No reported fevers or chills. No diarrhea. + constipation. Last drink and drug use was the night prior to admission. CT on admission with foci of free air in pelvis, abdominal and pelvic ascites, dilated fluid filled small bowel loops. Admitted to surgical service for further workup and management . Repeat CT abdomen with multiple dilated, fluid-filled nonenhancing mid small bowel loops compatible with bowel ischemia. No free air identified. Moderate to large ascites, increased compared to the prior study. High density layering within the stomach, possibly due to ingested material.    RRT called this evening for hypotension. On arrival of RRT SBP dropped to 60s. Pt lethargic but arousable, answering questions appropriately. periperal IV access placed on right arm. 2L NS IVF bolus given. Clinically with dry oral mucosa, + skin tenting. Pt with coffee ground emesis. Denies abdominal pain at time of RRT. + Nausea that resolved. BP improving to SBP 80s and 90s. STAT labs sent: CBC, coags, chemistries, lactate. Pt already received zosyn prior to RRT. Labs with significantly increased WBC. Lactate 3.5 Cr 1.66. NGT inserted during RRT with 200cc coffee ground decompressed from abdomen. Prontonix drip started. Surgical team at bedside         Allergies: No Known Allergies    MEDICATIONS  (STANDING):  zosyn  raltegavir  darunavir  ritonavir  albuterol 2 puff q6  pepcid  folic acid  thiamine      PAST MEDICAL & SURGICAL HISTORY:  Cocaine abuse  alcohol abuse ("three   Asthma  HIV (human immunodeficiency virus infection)  H/O:   S/P appendectomy      FAMILY HISTORY:  Family history of drug abuse (Mother)      SOCIAL HISTORY:  active cocaine/crack use  alcohol abuse (daily three 16oz wine coolers)     ADVANCE DIRECTIVES:  full code    REVIEW OF SYSTEMS:  CONSTITUTIONAL: No fever, no chills  ENMT:  No throat pain  BREASTS: No pain, masses, or nipple discharge  RESPIRATORY: No cough, wheezing; No shortness of breath  CARDIOVASCULAR: No chest pain, palpitations  GASTROINTESTINAL: + nausea + abdominal pain +emesis (coffee ground)  GENITOURINARY: No dysuria  NEUROLOGICAL: No headaches    ROS otherwise negative    Vital Signs Last 24 Hrs  T(F): 97 (17 @ 17:54), Max: 98 (17 @ 22:11)  HR: 82 (17 @ 17:54) (63 - 110)  BP: 112/70 (17 @ 17:54) (84/63 - 158/102)  RR: 16 (17 @ 17:54) (16 - 20)  SpO2: 97% (17 @ 17:54) (96% - 100%)      POCT Blood Glucose.: 89 mg/dL (2017 16:56)      PHYSICAL EXAM:    GENERAL: thin frail female, cachectic  HEAD:  Atraumatic, Normocephalic  EYES: EOMI, PERRLA, conjunctiva and sclera clear  ENMT: dry oral mucosa  NECK: Supple, No JVD, Normal thyroid  NERVOUS SYSTEM:  mildly lethargic, moving all extremities, oriented to person/place/time  CHEST/LUNG: Clear to percussion bilaterally; No rales, rhonchi, wheezing, or rubs  HEART: Regular rate and rhythm; No murmurs, rubs, or gallops  ABDOMEN: Soft, Nontender, Nondistended; Bowel sounds present  EXTREMITIES:  2+ Peripheral Pulses, No clubbing, cyanosis, or edema  LYMPH: No lymphadenopathy noted  SKIN: No rashes or lesions    LABS:                       14.9   26.9  )-----------( 201      ( 2017 17:23 )             45.7     11-    144  |  109<H>  |  27<H>  ----------------------------<  109<H>  4.2   |  24  |  1.66<H>    Ca    7.7<L>      2017 17:23  Phos  2.7       Mg     2.0         TPro  5.1<L>  /  Alb  2.6<L>  /  TBili  0.6  /  DBili  x   /  AST  17  /  ALT  24  /  AlkPhos  58  11-    PT/INR - ( 2017 17:23 )   PT: 11.0 sec;   INR: 1.01 ratio    PTT - ( 2017 17:23 )  PTT:24.4 sec              EKG:    ECHO, US:    RADIOLOGY:    CRITICAL CARE TIME SPENT: Patient is a 57y old  Female who presents with a chief complaint of Abdominal and Back Pain (2017 04:41)      HPI:  57F PMH HIV, active crack cocaine use, alcohol abuse, asthma, COPD on home O2 presents with abdominal and back pain Pt with nausea and emesis x2 days. No reported fevers or chills. No diarrhea. + constipation. Last drink and drug use was the night prior to admission. CT on admission with foci of free air in pelvis, abdominal and pelvic ascites, dilated fluid filled small bowel loops. Admitted to surgical service for further workup and management . Repeat CT abdomen with multiple dilated, fluid-filled nonenhancing mid small bowel loops compatible with bowel ischemia. No free air identified. Moderate to large ascites, increased compared to the prior study. High density layering within the stomach, possibly due to ingested material.    RRT called this evening for hypotension. On arrival of RRT SBP dropped to 60s. Pt lethargic but arousable, answering questions appropriately. periperal IV access placed on right arm. 2L NS IVF bolus given. Clinically with dry oral mucosa, + skin tenting. Pt with coffee ground emesis. Denies abdominal pain at time of RRT. + Nausea that resolved. BP improving to SBP 80s and 90s. STAT labs sent: CBC, coags, chemistries, lactate. Pt already received zosyn prior to RRT. Labs with significantly increased WBC. Lactate 3.5 Cr 1.66. NGT inserted during RRT with 200cc coffee ground decompressed from abdomen. Prontonix drip started. Surgical team at bedside abominal exam consistent with acute abdomen. Now with + distention + guarding + diffuse tenderness on exam. Pt for emergent OR however pt states she wants second opinion and wants to wait on surgery. Discussed with pt during RRT risk of death without surgery. Pt now agreeable. Surgery arranging for pt to go to OR.     CODE BLUE called to OR. (refer to CODE sheet for details). Pt after induction with propofol and intubation with cardiac arrest. V-fib on monitor. Shocked multiple times, multiple rounds of epi, s/p calcium s/p Mg, s/p bicarb pushes. +ROSC ~50 minutes. Emergent left femoral arterial line inserted. Started on levophed for BP support. IV fluid boluses given.              Allergies: No Known Allergies    MEDICATIONS  (STANDING):  zosyn  raltegavir  darunavir  ritonavir  albuterol 2 puff q6  pepcid  folic acid  thiamine      PAST MEDICAL & SURGICAL HISTORY:  Cocaine abuse  alcohol abuse ("three   Asthma  HIV (human immunodeficiency virus infection)  H/O:   S/P appendectomy      FAMILY HISTORY:  Family history of drug abuse (Mother)      SOCIAL HISTORY:  active cocaine/crack use  alcohol abuse (daily three 16oz wine coolers)     ADVANCE DIRECTIVES:  full code    REVIEW OF SYSTEMS:  CONSTITUTIONAL: No fever, no chills  ENMT:  No throat pain  BREASTS: No pain, masses, or nipple discharge  RESPIRATORY: No cough, wheezing; No shortness of breath  CARDIOVASCULAR: No chest pain, palpitations  GASTROINTESTINAL: + nausea + abdominal pain +emesis (coffee ground)  GENITOURINARY: No dysuria  NEUROLOGICAL: No headaches    ROS otherwise negative    Vital Signs Last 24 Hrs  T(F): 97 (17 @ 17:54), Max: 98 (17 @ 22:11)  HR: 82 (17 @ 17:54) (63 - 110)  BP: 112/70 (17 @ 17:54) (84/63 - 158/102)  RR: 16 (17 @ 17:54) (16 - 20)  SpO2: 97% (17 @ 17:54) (96% - 100%)      POCT Blood Glucose.: 89 mg/dL (2017 16:56)      PHYSICAL EXAM:    GENERAL: thin frail female, cachectic  HEAD:  Atraumatic, Normocephalic  EYES: EOMI, PERRLA, conjunctiva and sclera clear  ENMT: dry oral mucosa  NECK: Supple, No JVD, Normal thyroid  NERVOUS SYSTEM:  mildly lethargic, moving all extremities, oriented to person/place/time  CHEST/LUNG: Clear to percussion bilaterally; No rales, rhonchi, wheezing, or rubs  HEART: Regular rate and rhythm; No murmurs, rubs, or gallops  ABDOMEN: Soft, Nontender, Nondistended; Bowel sounds present  EXTREMITIES:  2+ Peripheral Pulses, No clubbing, cyanosis, or edema  LYMPH: No lymphadenopathy noted  SKIN: No rashes or lesions    LABS:                       14.9   26.9  )-----------( 201      ( 2017 17:23 )             45.7         144  |  109<H>  |  27<H>  ----------------------------<  109<H>  4.2   |  24  |  1.66<H>    Ca    7.7<L>      2017 17:23  Phos  2.7       Mg     2.0         TPro  5.1<L>  /  Alb  2.6<L>  /  TBili  0.6  /  DBili  x   /  AST  17  /  ALT  24  /  AlkPhos  58  -    PT/INR - ( 2017 17:23 )   PT: 11.0 sec;   INR: 1.01 ratio    PTT - ( 2017 17:23 )  PTT:24.4 sec              EKG:    ECHO, US:    RADIOLOGY:    CRITICAL CARE TIME SPENT: Patient is a 57y old  Female who presents with a chief complaint of Abdominal and Back Pain (2017 04:41)      HPI:  57F PMH HIV, active crack cocaine use, alcohol abuse, asthma, COPD on home O2 presents with abdominal and back pain Pt with nausea and emesis x2 days. No reported fevers or chills. No diarrhea. + constipation. Last drink and drug use was the night prior to admission. CT on admission with foci of free air in pelvis, abdominal and pelvic ascites, dilated fluid filled small bowel loops. Admitted to surgical service for further workup and management . Repeat CT abdomen with multiple dilated, fluid-filled nonenhancing mid small bowel loops compatible with bowel ischemia. No free air identified. Moderate to large ascites, increased compared to the prior study. High density layering within the stomach, possibly due to ingested material.    RRT called this evening for hypotension. On arrival of RRT SBP dropped to 60s. Pt lethargic but arousable, answering questions appropriately. periperal IV access placed on right arm. 2L NS IVF bolus given. Clinically with dry oral mucosa, + skin tenting. Pt with coffee ground emesis. Denies abdominal pain at time of RRT. + Nausea that resolved. BP improving to SBP 80s and 90s. STAT labs sent: CBC, coags, chemistries, lactate. Pt already received zosyn prior to RRT. Labs with significantly increased WBC. Lactate 3.5 Cr 1.66. NGT inserted during RRT with 200cc coffee ground decompressed from abdomen. Prontonix drip started. Surgical team at bedside abominal exam consistent with acute abdomen. Now with + distention + guarding + diffuse tenderness on exam. Pt for emergent OR however pt states she wants second opinion and wants to wait on surgery. Discussed with pt during RRT risk of death without surgery. Pt now agreeable. Surgery arranging for pt to go to OR.     CODE BLUE called to OR. (refer to CODE sheet for details). Pt after induction with propofol and intubation with cardiac arrest. V-fib on monitor. Shocked multiple times, multiple rounds of epi, s/p calcium s/p Mg, s/p bicarb pushes. +ROSC ~50 minutes. Emergent left femoral arterial line inserted. Started on levophed for BP support. IV fluid boluses given.  CBC and ABG sent. Surgical team after ROSC proceeded with planned surgery. Pt found with bowel ischemia due to adhesive band.             Allergies: No Known Allergies    MEDICATIONS  (STANDING):  zosyn  raltegavir  darunavir  ritonavir  albuterol 2 puff q6  pepcid  folic acid  thiamine      PAST MEDICAL & SURGICAL HISTORY:  Cocaine abuse  alcohol abuse ("three   Asthma  HIV (human immunodeficiency virus infection)  H/O:   S/P appendectomy      FAMILY HISTORY:  Family history of drug abuse (Mother)      SOCIAL HISTORY:  active cocaine/crack use  alcohol abuse (daily three 16oz wine coolers)     ADVANCE DIRECTIVES:  full code    REVIEW OF SYSTEMS:  CONSTITUTIONAL: No fever, no chills  ENMT:  No throat pain  BREASTS: No pain, masses, or nipple discharge  RESPIRATORY: No cough, wheezing; No shortness of breath  CARDIOVASCULAR: No chest pain, palpitations  GASTROINTESTINAL: + nausea + abdominal pain +emesis (coffee ground)  GENITOURINARY: No dysuria  NEUROLOGICAL: No headaches    ROS otherwise negative    Vital Signs Last 24 Hrs  T(F): 97 (17 @ 17:54), Max: 98 (17 @ 22:11)  HR: 82 (17 @ 17:54) (63 - 110)  BP: 112/70 (17 @ 17:54) (84/63 - 158/102)  RR: 16 (17 @ 17:54) (16 - 20)  SpO2: 97% (17 @ 17:54) (96% - 100%)      POCT Blood Glucose.: 89 mg/dL (2017 16:56)      PHYSICAL EXAM:    GENERAL: thin frail female, cachectic  HEAD:  Atraumatic, Normocephalic  EYES: EOMI, PERRLA, conjunctiva and sclera clear  ENMT: dry oral mucosa  NECK: Supple, No JVD, Normal thyroid  NERVOUS SYSTEM:  mildly lethargic, moving all extremities, oriented to person/place/time  CHEST/LUNG: Clear to percussion bilaterally; No rales, rhonchi, wheezing, or rubs  HEART: Regular rate and rhythm; No murmurs, rubs, or gallops  ABDOMEN: Soft, Nontender, Nondistended; Bowel sounds present  EXTREMITIES:  2+ Peripheral Pulses, No clubbing, cyanosis, or edema  LYMPH: No lymphadenopathy noted  SKIN: No rashes or lesions    LABS:                       14.9   26.9  )-----------( 201      ( 2017 17:23 )             45.7     11-04    144  |  109<H>  |  27<H>  ----------------------------<  109<H>  4.2   |  24  |  1.66<H>    Ca    7.7<L>      2017 17:23  Phos  2.7     11-  Mg     2.0     -    TPro  5.1<L>  /  Alb  2.6<L>  /  TBili  0.6  /  DBili  x   /  AST  17  /  ALT  24  /  AlkPhos  58  11-04    PT/INR - ( 2017 17:23 )   PT: 11.0 sec;   INR: 1.01 ratio    PTT - ( 2017 17:23 )  PTT:24.4 sec              EKG:    ECHO, US:    RADIOLOGY:    CRITICAL CARE TIME SPENT: Patient is a 57y old  Female who presents with a chief complaint of Abdominal and Back Pain (2017 04:41)      HPI:  57F PMH HIV, active crack cocaine use, alcohol abuse, asthma, COPD on home O2 presents with abdominal and back pain Pt with nausea and emesis x2 days. No reported fevers or chills. No diarrhea. + constipation. Last drink and drug use was the night prior to admission. CT on admission with foci of free air in pelvis, abdominal and pelvic ascites, dilated fluid filled small bowel loops. Admitted to surgical service for further workup and management . Repeat CT abdomen with multiple dilated, fluid-filled nonenhancing mid small bowel loops compatible with bowel ischemia. No free air identified. Moderate to large ascites, increased compared to the prior study. High density layering within the stomach, possibly due to ingested material.    RRT called this evening for hypotension. On arrival of RRT SBP dropped to 60s. Pt lethargic but arousable, answering questions appropriately. periperal IV access placed on right arm. 2L NS IVF bolus given. Clinically with dry oral mucosa, + skin tenting. Pt with coffee ground emesis. Denies abdominal pain at time of RRT. + Nausea that resolved. BP improving to SBP 80s and 90s. STAT labs sent: CBC, coags, chemistries, lactate. Pt already received zosyn prior to RRT. Labs with significantly increased WBC. Lactate 3.5 Cr 1.66. NGT inserted during RRT with 200cc coffee ground decompressed from abdomen. Prontonix drip started. Surgical team at bedside abominal exam consistent with acute abdomen. Now with + distention + guarding + diffuse tenderness on exam. Pt for emergent OR however pt states she wants second opinion and wants to wait on surgery. Discussed with pt during RRT risk of death without surgery. Pt now agreeable. Surgery arranging for pt to go to OR.     CODE BLUE called to OR. (refer to CODE sheet for details). Pt after induction with propofol and intubation with cardiac arrest. V-fib on monitor. Shocked multiple times, multiple rounds of epi, s/p calcium s/p Mg, s/p bicarb pushes. +ROSC ~50 minutes. Emergent left femoral arterial line inserted. Started on levophed for BP support. IV fluid boluses given.  CBC and ABG sent. Surgical team after ROSC proceeded with planned surgery. Pt found with bowel ischemia due to adhesive band.      Allergies: No Known Allergies    MEDICATIONS  (STANDING):  zosyn  raltegavir  darunavir  ritonavir  albuterol 2 puff q6  pepcid  folic acid  thiamine      PAST MEDICAL & SURGICAL HISTORY:  Cocaine abuse  alcohol abuse  Asthma  HIV (human immunodeficiency virus infection)  H/O:   S/P appendectomy      FAMILY HISTORY:  Family history of drug abuse (Mother)      SOCIAL HISTORY:  active cocaine/crack use  alcohol abuse (daily three 16oz wine coolers)     ADVANCE DIRECTIVES:  full code    REVIEW OF SYSTEMS:  CONSTITUTIONAL: No fever, no chills  ENMT:  No throat pain  BREASTS: No pain, masses, or nipple discharge  RESPIRATORY: No cough, wheezing; No shortness of breath  CARDIOVASCULAR: No chest pain, palpitations  GASTROINTESTINAL: + nausea + abdominal pain +emesis (coffee ground)  GENITOURINARY: No dysuria  NEUROLOGICAL: No headaches    ROS otherwise negative    Vital Signs Last 24 Hrs  T(F): 97 (17 @ 17:54), Max: 98 (17 @ 22:11)  HR: 82 (17 @ 17:54) (63 - 110)  BP: 112/70 (17 @ 17:54) (84/63 - 158/102)  RR: 16 (17 @ 17:54) (16 - 20)  SpO2: 97% (17 @ 17:54) (96% - 100%)      POCT Blood Glucose.: 89 mg/dL (2017 16:56)      PHYSICAL EXAM:    GENERAL: thin frail female, cachectic  HEAD:  Atraumatic, Normocephalic  EYES: EOMI, PERRLA, conjunctiva and sclera clear  ENMT: dry oral mucosa  NECK: Supple, No JVD, Normal thyroid  NERVOUS SYSTEM:  mildly lethargic, moving all extremities, oriented to person/place/time  CHEST/LUNG: Clear to percussion bilaterally; No rales, rhonchi, wheezing, or rubs  HEART: Regular rate and rhythm; No murmurs, rubs, or gallops  ABDOMEN: Soft, Nontender, Nondistended; Bowel sounds present  EXTREMITIES:  2+ Peripheral Pulses, No clubbing, cyanosis, or edema  LYMPH: No lymphadenopathy noted  SKIN: No rashes or lesions    LABS:                       14.9   26.9  )-----------( 201      ( 2017 17:23 )             45.7     11-04    144  |  109<H>  |  27<H>  ----------------------------<  109<H>  4.2   |  24  |  1.66<H>    Ca    7.7<L>      2017 17:23  Phos  2.7     11-  Mg     2.0     -    TPro  5.1<L>  /  Alb  2.6<L>  /  TBili  0.6  /  DBili  x   /  AST  17  /  ALT  24  /  AlkPhos  58  11-04    PT/INR - ( 2017 17:23 )   PT: 11.0 sec;   INR: 1.01 ratio    PTT - ( 2017 17:23 )  PTT:24.4 sec              EKG:    ECHO, US:    RADIOLOGY:    CRITICAL CARE TIME SPENT: Patient is a 57y old  Female who presents with a chief complaint of Abdominal and Back Pain (2017 04:41)      HPI:  57F PMH HIV, active crack cocaine use, alcohol abuse, asthma, COPD on home O2 presents with abdominal and back pain Pt with nausea and emesis x2 days. No reported fevers or chills. No diarrhea. + constipation. Last drink and drug use was the night prior to admission. CT on admission with foci of free air in pelvis, abdominal and pelvic ascites, dilated fluid filled small bowel loops. Admitted to surgical service for further workup and management . Repeat CT abdomen with multiple dilated, fluid-filled nonenhancing mid small bowel loops compatible with bowel ischemia. No free air identified. Moderate to large ascites, increased compared to the prior study. High density layering within the stomach, possibly due to ingested material.    RRT called this evening for hypotension. On arrival of RRT SBP dropped to 60s. Pt lethargic but arousable, answering questions appropriately. periperal IV access placed on right arm. 2L NS IVF bolus given. Clinically with dry oral mucosa, + skin tenting. Pt with coffee ground emesis. Denies abdominal pain at time of RRT. + Nausea that resolved. BP improving to SBP 80s and 90s. STAT labs sent: CBC, coags, chemistries, lactate. Pt already received zosyn prior to RRT. Labs with significantly increased WBC. Lactate 3.5 Cr 1.66. NGT inserted during RRT with 200cc coffee ground decompressed from abdomen. Prontonix drip started. Surgical team at bedside abominal exam consistent with acute abdomen. Now with + distention + guarding + diffuse tenderness on exam. Pt for emergent OR however pt states she wants second opinion and wants to wait on surgery. Discussed with pt during RRT risk of death without surgery. Pt now agreeable. Surgery arranging for pt to go to OR.     CODE BLUE called to OR. (refer to CODE sheet for details). Pt after induction with propofol and intubation with cardiac arrest. V-fib on monitor. Shocked multiple times, multiple rounds of epi, s/p calcium s/p Mg, s/p bicarb pushes. +ROSC ~50 minutes. Emergent left femoral arterial line inserted. Started on levophed for BP support. IV fluid boluses given.  CBC and ABG sent. Surgical team after ROSC proceeded with planned surgery. Pt found with bowel ischemia due to adhesive band.      Allergies: No Known Allergies    MEDICATIONS  (STANDING):  zosyn  raltegavir  darunavir  ritonavir  albuterol 2 puff q6  pepcid  folic acid  thiamine      PAST MEDICAL & SURGICAL HISTORY:  Cocaine abuse  alcohol abuse  Asthma  COPD O2 dependent  HIV (human immunodeficiency virus infection)  H/O:   S/P appendectomy      FAMILY HISTORY:  Family history of drug abuse (Mother)      SOCIAL HISTORY:  active cocaine/crack use  alcohol abuse (daily three 16oz wine coolers)     ADVANCE DIRECTIVES:  full code    REVIEW OF SYSTEMS:  CONSTITUTIONAL: No fever, no chills  ENMT:  No throat pain  BREASTS: No pain, masses, or nipple discharge  RESPIRATORY: No cough, wheezing; No shortness of breath  CARDIOVASCULAR: No chest pain, palpitations  GASTROINTESTINAL: + nausea + abdominal pain +emesis (coffee ground)  GENITOURINARY: No dysuria  NEUROLOGICAL: No headaches    ROS otherwise negative    Vital Signs Last 24 Hrs  T(F): 97 (17 @ 17:54), Max: 98 (17 @ 22:11)  HR: 82 (17 @ 17:54) (63 - 110)  BP: 112/70 (17 @ 17:54) (84/63 - 158/102)  RR: 16 (17 @ 17:54) (16 - 20)  SpO2: 97% (17 @ 17:54) (96% - 100%)      POCT Blood Glucose.: 89 mg/dL (2017 16:56)      PHYSICAL EXAM:    GENERAL: thin frail female, cachectic  HEAD:  Atraumatic, Normocephalic  EYES: EOMI, PERRLA, conjunctiva and sclera clear  ENMT: dry oral mucosa, teeth stained from coffee ground emesis  NECK: Supple, No JVD, Normal thyroid  NERVOUS SYSTEM:  mildly lethargic, moving all extremities, oriented to person/place/time  CHEST/LUNG: Clear to  ausculation bilaterally; No rales, rhonchi  HEART: Regular rate and rhythm  ABDOMEN:  Bowel sounds present + tenderness to palpation + distention  EXTREMITIES:  2+ Peripheral Pulses, No cyanosis, or edema  SKIN: No rashes + dry skin with skin tenting    LABS:                       14.9   26.9  )-----------( 201      ( 2017 17:23 )             45.7     11    144  |  109<H>  |  27<H>  ----------------------------<  109<H>  4.2   |  24  |  1.66<H>    Ca    7.7<L>      2017 17:23  Phos  2.7       Mg     2.0         TPro  5.1<L>  /  Alb  2.6<L>  /  TBili  0.6  /  AST  17  /  ALT  24  /  AlkPhos  58      PT/INR - ( 2017 17:23 )   PT: 11.0 sec;   INR: 1.01 ratio    PTT - ( 2017 17:23 )  PTT:24.4 sec    Lactate, Blood (..17 @ 17:23)    Lactate, Blood: 3.5 mmol/L    Lactate, Blood (..17 @ 20:04)    Lactate, Blood: 1.8 mmol/L      RADIOLOGY:  CT abdomen < from: CT Abdomen and Pelvis w/ Oral Cont (..17 @ 02:50) >  LOWER CHEST: Mild emphysema.    LIVER: Within normal limits.  BILE DUCTS: Extrahepatic ductal dilatation measuring 11 mm tapering   distally to 4 mm  GALLBLADDER: Layering gallbladder sludge.  SPLEEN: Within normal limits.  PANCREAS: Mild pancreatic ductal dilatation measuring up to 4 mm with   distal tapering. No discrete obstructing stone or lesion on this   noncontrast exam.  ADRENALS: Within normal limits.  KIDNEYS/URETERS: 2.3 x 1.3 cm left lower pole parapelvic cyst. No   hydroureteronephrosis or radiopaque stones. Unremarkable right kidney.    URINARY BLADDER: Within normal limits.  REPRODUCTIVE ORGANS: Unremarkable uterus and adnexa.    BOWEL/MESENTERY/PERITONEUM/RETROPERITONEUM: A small amount ofcontrast is   seen in the stomach at time of imaging.  Multiple foci of free air in the   pelvis, slightly asymmetric to the right, without a definite source.   Sigmoid diverticulosis without definite evidence acute diverticulitis.   Mildly dilated fluid-filled small bowel loops in the lower abdomen and   pelvis, nonspecific. No definite evidence bowel obstruction.   Nonvisualization of the appendix.  Moderate abdominal and pelvic ascites.   No fluid collection.    VESSELS:  Within normal limits.  LYMPH NODES: No abdominal or pelvic lymphadenopathy.  SOFT TISSUES: Within normal limits.  BONES: No suspicious osseous lesions. Mild degenerative changes of the   bilateral hips.      CT abdomen  < from: CT Abdomen and Pelvis w/wo IV Cont (11.04.17 @ 16:02) >  Multiple dilated, fluid-filled nonenhancing mid small bowel loops   compatible with bowel ischemia which may be due to vascular accident or   less likely a closed loop obstruction. No free air identified.     Peripheralareas of hypoenhancement within the liver and striated   nephrograms are nonspecific and may be in the setting of hypovolemia.   Slitlike IVC and hyperenhancement of the adrenal glands may be seen in   the setting of shock. Correlate clinically.     Moderate to large ascites, increased compared to the prior study.    High density layering within the stomach, possibly due to ingested   material.      CRITICAL CARE TIME SPENT: 120 min including time at RRT and code, not including procedures

## 2017-11-04 NOTE — ED PROVIDER NOTE - MEDICAL DECISION MAKING DETAILS
A/P: 56 y/o f w hx of HIV, COPD, presents w epigastric abd pain today; no peritoneal signs on exam; CTAP significant for multiple foci of free air in pelvis of unclear etiology; covered w zosyn; surgery team aware, at bedside;

## 2017-11-04 NOTE — CONSULT NOTE ADULT - PROBLEM SELECTOR RECOMMENDATION 9
etiology most likely due to UGIB. insetting of etoh abuse and cocaine abuse    start PPI drip. check cbc stat and monitor q8 hourly etiology most likely due to UGIB. insetting of etoh abuse and cocaine abuse    start PPI drip. check cbc stat and monitor q8 hourly, type and cross match   ngt placed approx 700 cc dark bilious /bloody fluid keep to LWS

## 2017-11-04 NOTE — H&P ADULT - HISTORY OF PRESENT ILLNESS
57F presented with abdominal and back pain which started earlier today. Per ED, she was nauseated and vomiting at presentation. Now states that she has severe back pain (was pacing at the foot of the bed when I entered the room). No HA, CP, SOB. Nauseated. No dysuria, normal BM.

## 2017-11-04 NOTE — CHART NOTE - NSCHARTNOTEFT_GEN_A_CORE
Delayed CTAP was performed this afternoon:  Results reviewed with Dr Pineda radiologist (see below)  Dr Garcia informed.     Patient seen and examined bedside.  Appears lethargic.   C/o persistent abd pain.  Reports coffee ground emesis throughout the day.  She admits to drinking 3 16oz beers daily.    Vitals performed stat myself: BP 66/42  A Rapid response was called.  2L NS bolus given and patient was placed in trendelenburg.    T(F): 97 (11-04-17 @ 17:54), Max: 98 (11-03-17 @ 22:11)  HR: 82 (11-04-17 @ 17:54) (63 - 110)  BP: 112/70 (11-04-17 @ 17:54) (84/63 - 158/102)  RR: 16 (11-04-17 @ 17:54) (16 - 20)  SpO2: 97% (11-04-17 @ 17:54) (96% - 100%)      POCT Blood Glucose.: 89 mg/dL (04 Nov 2017 16:56)      PHYSICAL EXAM:  General: somnolent. arousable  Neuro: follows commands, Alert and oriented  CV: +S1+S2 regular rate and rhythm  Lung: clear to ausculation bilaterally, respirations nonlabored  Abdomen: Rigid, moderately distended and diffusely tender with hypoactive BS.   Extremities: no pedal edema or calf tenderness noted     LABS:                        14.9   26.9  )-----------( 201      ( 04 Nov 2017 17:23 )             45.7     11-04    144  |  109<H>  |  27<H>  ----------------------------<  109<H>  4.2   |  24  |  1.66<H>    Ca    7.7<L>      04 Nov 2017 17:23  Phos  2.7     11-04  Mg     2.0     11-04    TPro  5.1<L>  /  Alb  2.6<L>  /  TBili  0.6  /  DBili  x   /  AST  17  /  ALT  24  /  AlkPhos  58  11-04    PT/INR - ( 04 Nov 2017 17:23 )   PT: 11.0 sec;   INR: 1.01 ratio    PTT - ( 04 Nov 2017 17:23 )  PTT:24.4 sec      < from: CT Abdomen and Pelvis w/wo IV Cont (11.04.17 @ 16:02) >    Multiple dilated, fluid-filled nonenhancing mid small bowel loops   compatible with bowel ischemia which may be due to vascular accident or   less likely a closed loop obstruction. No free air identified.     Peripheralareas of hypoenhancement within the liver and striated   nephrograms are nonspecific and may be in the setting of hypovolemia.   Slitlike IVC and hyperenhancement of the adrenal glands may be seen in   the setting of shock. Correlate clinically.     Moderate to large ascites, increased compared to the prior study.    High density layering within the stomach, possibly due to ingested   material.    < end of copied text >        Impression: 57y Female with PMH cocaine abuse, HIV, ETOH use, asthma admitted with abdominal pain, now with UGIB and evidence of bowel ischemia on repeat imaging  -NGT inserted at bedside, 300cc coffee ground emesis drained to LWS  -protonix gtt ordered. Case discussed with Dr Cervantes, GI  -consent for emergent OR for ex lap, poss SBR and poss ostomy obtained by Dr Garcia  -cont IVF, IV abx zosyn  -medicine input appreciated

## 2017-11-04 NOTE — H&P ADULT - NSHPPHYSICALEXAM_GEN_ALL_CORE
AAO in mild distress 2/2 pain. patient standing when I entered the room. Able to climb back on to the stretcher.  Very thin, frail in stature.  Lungs CTAB without wheezing.  Heart RRR  Abdomen softly distended, diffusely tender, more in LLQ. Non-peritoneal. No rebound, no guarding.  Midline well-healed surgical laparotomy scar from prior appendectomy with palpable nodules in janet-umbilical area.  Palpable radial/femoral pulses.  No obvious lesions on back. No CVA tenderness.

## 2017-11-04 NOTE — CONSULT NOTE ADULT - ASSESSMENT
57F PMH HIV, active crack cocaine use, alcohol abuse, asthma, COPD on home O2 presents with abdominal pain. COurse with coffee ground emesis, sepsis with septic shock secondary to ischemic bowel, V-fib cardiac arrest. Metabolic acidosis acute renal failure ischemic ATN from hypotension.     Transfer to ICU post op    1. CV  - s/p prolonged V fib cardiac arrest  - concerns for anoxic brain injury along with multiorgan failure s/p such prolonged arrest  - shock state: pressor support to maintain MAP > 65  - aggressive IVF resuscitation  - likely will need a central line for access if not placed during OR    2. PULM  - intubated for procedure  - will be kept intubated post op  - mechanical vent support  - ABG    3. GI  - ischemic bowel. Pt currently in OR  - NGT to suction  - protonix drip  - monitor CBC: Hb count    4. HEME  - check post op labs: cbc/chemistries/coags/abg/lactate once patient is out of OR    5. RENAL  - anticipate worsening renal function  - monitor output    6. GEN  - goals of care with daughters: pt is full code

## 2017-11-04 NOTE — CONSULT NOTE ADULT - PROBLEM SELECTOR RECOMMENDATION 2
ct scan strongly concerning for ischemic bowel lactate elevated. keep npo   general surgery attending notified and is discussing with family regarding urgent surgery ct scan strongly concerning for ischemic bowel lactate elevated. keep npo   general surgery attending notified and is discussing with family in person  regarding urgent surgery   checking all labs again stat

## 2017-11-04 NOTE — CONSULT NOTE ADULT - ASSESSMENT
this raegan 58 y/o female with pmhx of etoc abuse cocaine use hiv who presents with abd pain coffee ground emesis , with ct scan ? ischemic bowel. subsequently had RRT due to hypotension this is a  58 y/o female with pmhx of etoh abuse cocaine use hiv who presents with abd pain coffee ground emesis , with ct scan ? ischemic bowel. subsequently with active  RRT due to hypotension

## 2017-11-04 NOTE — CONSULT NOTE ADULT - SUBJECTIVE AND OBJECTIVE BOX
Patient seen and  examined during RRT having active coffee ground emesis   initial BP systolic of 60s    S: no abd pain a bit sleepy   O: Vitals  T:96.7 rectal  BP: 84/63     RR 16    Patient is a 57y old  Female who presents with a chief complaint of Abdominal and Back Pain (04 Nov 2017 04:41)      OVERNIGHT EVENTS: admitted for abd pain to surgical service          MEDICATIONS  (STANDING):  ALBUTerol    90 MICROgram(s) HFA Inhaler 2 Puff(s) Inhalation every 6 hours  chlorhexidine 0.12% Liquid 15 milliLiter(s) Swish and Spit two times a day  influenza   Vaccine 0.5 milliLiter(s) IntraMuscular once  lactated ringers. 1000 milliLiter(s) (100 mL/Hr) IV Continuous <Continuous>  norepinephrine Infusion 0.01 MICROgram(s)/kG/Min (1.667 mL/Hr) IV Continuous <Continuous>  pantoprazole  Injectable 40 milliGRAM(s) IV Push daily  phenylephrine    Infusion 0.4 MICROgram(s)/kG/Min (13.335 mL/Hr) IV Continuous <Continuous>  piperacillin/tazobactam IVPB. 3.375 Gram(s) IV Intermittent every 8 hours  vancomycin  IVPB 1000 milliGRAM(s) IV Intermittent every 24 hours  vasopressin Infusion 0.04 Unit(s)/Min (2.4 mL/Hr) IV Continuous <Continuous>    MEDICATIONS  (PRN):      Allergies    No Known Allergies    Intolerances        SUBJECTIVE: in bed in NAD, no acute events overnight     T(F): 96.2 (11-05-17 @ 04:00), Max: 98 (11-04-17 @ 09:05)  HR: 113 (11-05-17 @ 08:16) (72 - 117)  BP: 32/22 (11-05-17 @ 02:50) (32/22 - 138/73)  RR: 14 (11-05-17 @ 07:00) (12 - 20)  SpO2: 100% (11-05-17 @ 07:00) (92% - 100%)  Wt(kg): --    PHYSICAL EXAM:  GENERAL: NAD, very thin chronciaclly ill appearing female   HEAD:  Atraumatic, Normocephalic  EYES: EOMI, PERRLA, conjunctiva and sclera clear  ENMT: dry mucus membranes , poor dentition   NECK: Supple, No JVD, Normal thyroid  CHEST/LUNG: Clear to  auscultation bilaterally; No rales, rhonchi, wheezing, or rubs  bilaterally  HEART: tachycardic,   GI: hypoactive BS, distended firm tympanic but no tenderness.   EXTREMITIES:  2+ Peripheral Pulses, No clubbing, cyanosis, or edema BL LE, thin extremities   SKIN: No rashes or lesions  NERVOUS SYSTEM:  sleepy  but oriented  X3, Good concentration; Motor Strength not assess during RRT        LABS:              reviewed am labs        Cultures;   CAPILLARY BLOOD GLUCOSE      POCT Blood Glucose.: 89 mg/dL (04 Nov 2017 16:56)    Lipid panel:     CARDIAC MARKERS ( 04 Nov 2017 00:18 )  <.015 ng/mL / x     / x     / x     / x            RADIOLOGY & ADDITIONAL TESTS:  < from: CT Abdomen and Pelvis w/ Oral Cont (11.04.17 @ 02:50) >  IMPRESSION:      Limited exam.  Multiple foci of free air in the pelvis without a definite source; a   perforated sigmoid colon is a possibility.  Mildly dilated fluid-filled   small bowel loops in the lower abdomen and pelvis, nonspecific. No   definite evidence of bowel obstruction.  Moderate abdominal and pelvic ascites.  Nonvisualization of the appendix.  Biliary and pancreatic ductal dilatation of uncertain significance   without a discrete radiopaque stone or obstructing lesion. Further   evaluation with nonemergent MRCP would be helpful.    < end of copied text >        Imaging Personally Reviewed:  [ ] YES      Consultant(s) Notes Reviewed:  [ ] YES     Care Discussed with [ ] Consultants [X ] Patient [ ] Family  [x ]    [x ]  Other; RN

## 2017-11-04 NOTE — ED PROVIDER NOTE - CARE PLAN
Principal Discharge DX:	Abdominal pain Principal Discharge DX:	Abdominal pain  Secondary Diagnosis:	Perforation bowel

## 2017-11-04 NOTE — ED PROVIDER NOTE - PHYSICAL EXAMINATION
Gen: no acute distress;   HEENT: clear oropharynx; b/l temporal wasting; clear oropharynx;   neck: full Rom  CV: RRR; no m/g/r  lungs; CTAB; no w/r/r  abd; soft, + epigastric ttp; no distention; no rebound or guarding;   ext: wwp; full ROm  neuro: no focal deficits;   skin: no rash

## 2017-11-04 NOTE — ED PROVIDER NOTE - OBJECTIVE STATEMENT
Pertinent PMH/PSH/FHx/SHx and Review of Systems contained within:    56 y/o F w hx of HIV, COPD, presents w abdominal pain today associated w vomiting x 2; patient reports pain mostly in upper abdomen/epigastricum, denies fever or chills; denies diarrhea; denies urinary symptoms; reports vomiting has been non bloody, non bilious; ROS otherwise negative    PMH: as above  allergies; nkda  SH: denies cig or drug use

## 2017-11-04 NOTE — H&P ADULT - NSHPLABSRESULTS_GEN_ALL_CORE
Labs reviewed, unremarkable.  CT reviewed. Significant for small foci of extraluminal air in pelvis with unknown source.

## 2017-11-04 NOTE — CONSULT NOTE ADULT - PROBLEM SELECTOR RECOMMENDATION 3
presumed intraabdominal source . stat blood cultures and start zosyn .. stat bolus during RRT with response stat bolus during RRT with response   continue with ivf    type and cross match

## 2017-11-05 LAB
ALBUMIN SERPL ELPH-MCNC: 1.2 G/DL — LOW (ref 3.3–5)
ALBUMIN SERPL ELPH-MCNC: 1.3 G/DL — LOW (ref 3.3–5)
ALBUMIN SERPL ELPH-MCNC: 1.4 G/DL — LOW (ref 3.3–5)
ALP SERPL-CCNC: 58 U/L — SIGNIFICANT CHANGE UP (ref 40–120)
ALP SERPL-CCNC: 64 U/L — SIGNIFICANT CHANGE UP (ref 40–120)
ALP SERPL-CCNC: 67 U/L — SIGNIFICANT CHANGE UP (ref 40–120)
ALT FLD-CCNC: 1487 U/L — HIGH (ref 12–78)
ALT FLD-CCNC: 3599 U/L — HIGH (ref 12–78)
ALT FLD-CCNC: 900 U/L — HIGH (ref 12–78)
ANION GAP SERPL CALC-SCNC: 21 MMOL/L — HIGH (ref 5–17)
ANION GAP SERPL CALC-SCNC: 22 MMOL/L — HIGH (ref 5–17)
ANION GAP SERPL CALC-SCNC: 23 MMOL/L — HIGH (ref 5–17)
APTT BLD: 46 SEC — HIGH (ref 27.5–37.4)
APTT BLD: 58.2 SEC — HIGH (ref 27.5–37.4)
AST SERPL-CCNC: 1336 U/L — HIGH (ref 15–37)
AST SERPL-CCNC: 2194 U/L — HIGH (ref 15–37)
AST SERPL-CCNC: 5863 U/L — HIGH (ref 15–37)
BASE EXCESS BLDA CALC-SCNC: -12.3 MMOL/L — LOW (ref -2–2)
BASE EXCESS BLDA CALC-SCNC: -15 MMOL/L — LOW (ref -2–2)
BASE EXCESS BLDA CALC-SCNC: -18.3 MMOL/L — LOW (ref -2–2)
BASE EXCESS BLDA CALC-SCNC: -9.4 MMOL/L — LOW (ref -2–2)
BASOPHILS # BLD AUTO: 0.1 K/UL — SIGNIFICANT CHANGE UP (ref 0–0.2)
BASOPHILS NFR BLD AUTO: 0.5 % — SIGNIFICANT CHANGE UP (ref 0–2)
BILIRUB SERPL-MCNC: 0.8 MG/DL — SIGNIFICANT CHANGE UP (ref 0.2–1.2)
BILIRUB SERPL-MCNC: 1.2 MG/DL — SIGNIFICANT CHANGE UP (ref 0.2–1.2)
BILIRUB SERPL-MCNC: 2 MG/DL — HIGH (ref 0.2–1.2)
BLOOD GAS COMMENTS: SIGNIFICANT CHANGE UP
BLOOD GAS SOURCE: SIGNIFICANT CHANGE UP
BUN SERPL-MCNC: 27 MG/DL — HIGH (ref 7–23)
BUN SERPL-MCNC: 28 MG/DL — HIGH (ref 7–23)
BUN SERPL-MCNC: 29 MG/DL — HIGH (ref 7–23)
CALCIUM SERPL-MCNC: 6.3 MG/DL — CRITICAL LOW (ref 8.5–10.1)
CALCIUM SERPL-MCNC: 7 MG/DL — LOW (ref 8.5–10.1)
CALCIUM SERPL-MCNC: 7.8 MG/DL — LOW (ref 8.5–10.1)
CHLORIDE SERPL-SCNC: 113 MMOL/L — HIGH (ref 96–108)
CHLORIDE SERPL-SCNC: 115 MMOL/L — HIGH (ref 96–108)
CHLORIDE SERPL-SCNC: 118 MMOL/L — HIGH (ref 96–108)
CK SERPL-CCNC: 1668 U/L — HIGH (ref 26–192)
CK SERPL-CCNC: 2706 U/L — HIGH (ref 26–192)
CO2 SERPL-SCNC: 13 MMOL/L — LOW (ref 22–31)
CO2 SERPL-SCNC: 14 MMOL/L — LOW (ref 22–31)
CO2 SERPL-SCNC: 14 MMOL/L — LOW (ref 22–31)
CREAT SERPL-MCNC: 1.89 MG/DL — HIGH (ref 0.5–1.3)
CREAT SERPL-MCNC: 1.96 MG/DL — HIGH (ref 0.5–1.3)
CREAT SERPL-MCNC: 2.95 MG/DL — HIGH (ref 0.5–1.3)
EOSINOPHIL # BLD AUTO: 0 K/UL — SIGNIFICANT CHANGE UP (ref 0–0.5)
EOSINOPHIL NFR BLD AUTO: 0.1 % — SIGNIFICANT CHANGE UP (ref 0–6)
GLUCOSE BLDC GLUCOMTR-MCNC: 120 MG/DL — HIGH (ref 70–99)
GLUCOSE BLDC GLUCOMTR-MCNC: 137 MG/DL — HIGH (ref 70–99)
GLUCOSE BLDC GLUCOMTR-MCNC: 137 MG/DL — HIGH (ref 70–99)
GLUCOSE BLDC GLUCOMTR-MCNC: 144 MG/DL — HIGH (ref 70–99)
GLUCOSE BLDC GLUCOMTR-MCNC: 160 MG/DL — HIGH (ref 70–99)
GLUCOSE BLDC GLUCOMTR-MCNC: 172 MG/DL — HIGH (ref 70–99)
GLUCOSE BLDC GLUCOMTR-MCNC: 53 MG/DL — LOW (ref 70–99)
GLUCOSE BLDC GLUCOMTR-MCNC: 70 MG/DL — SIGNIFICANT CHANGE UP (ref 70–99)
GLUCOSE SERPL-MCNC: 118 MG/DL — HIGH (ref 70–99)
GLUCOSE SERPL-MCNC: 210 MG/DL — HIGH (ref 70–99)
GLUCOSE SERPL-MCNC: 63 MG/DL — LOW (ref 70–99)
HCO3 BLDA-SCNC: 10 MMOL/L — LOW (ref 21–29)
HCO3 BLDA-SCNC: 12 MMOL/L — LOW (ref 21–29)
HCO3 BLDA-SCNC: 15 MMOL/L — LOW (ref 21–29)
HCO3 BLDA-SCNC: 20 MMOL/L — LOW (ref 21–29)
HCT VFR BLD CALC: 24.8 % — LOW (ref 34.5–45)
HCT VFR BLD CALC: 26.8 % — LOW (ref 34.5–45)
HGB BLD-MCNC: 7.8 G/DL — LOW (ref 11.5–15.5)
HGB BLD-MCNC: 8.4 G/DL — LOW (ref 11.5–15.5)
HOROWITZ INDEX BLDA+IHG-RTO: 100 — SIGNIFICANT CHANGE UP
HOROWITZ INDEX BLDA+IHG-RTO: 40 — SIGNIFICANT CHANGE UP
HOROWITZ INDEX BLDA+IHG-RTO: 40 — SIGNIFICANT CHANGE UP
HOROWITZ INDEX BLDA+IHG-RTO: 60 — SIGNIFICANT CHANGE UP
INR BLD: 1.73 RATIO — HIGH (ref 0.88–1.16)
INR BLD: 4.58 RATIO — HIGH (ref 0.88–1.16)
LACTATE SERPL-SCNC: 13.6 MMOL/L — CRITICAL HIGH (ref 0.7–2)
LACTATE SERPL-SCNC: 15.6 MMOL/L — CRITICAL HIGH (ref 0.7–2)
LYMPHOCYTES # BLD AUTO: 2 K/UL — SIGNIFICANT CHANGE UP (ref 1–3.3)
LYMPHOCYTES # BLD AUTO: 9.2 % — LOW (ref 13–44)
MAGNESIUM SERPL-MCNC: 2.5 MG/DL — SIGNIFICANT CHANGE UP (ref 1.6–2.6)
MAGNESIUM SERPL-MCNC: 3 MG/DL — HIGH (ref 1.6–2.6)
MCHC RBC-ENTMCNC: 29.3 PG — SIGNIFICANT CHANGE UP (ref 27–34)
MCHC RBC-ENTMCNC: 30.4 PG — SIGNIFICANT CHANGE UP (ref 27–34)
MCHC RBC-ENTMCNC: 31.2 GM/DL — LOW (ref 32–36)
MCHC RBC-ENTMCNC: 31.5 GM/DL — LOW (ref 32–36)
MCV RBC AUTO: 93 FL — SIGNIFICANT CHANGE UP (ref 80–100)
MCV RBC AUTO: 97.2 FL — SIGNIFICANT CHANGE UP (ref 80–100)
MONOCYTES # BLD AUTO: 0.6 K/UL — SIGNIFICANT CHANGE UP (ref 0–0.9)
MONOCYTES NFR BLD AUTO: 2.6 % — SIGNIFICANT CHANGE UP (ref 2–14)
NEUTROPHILS # BLD AUTO: 19.3 K/UL — HIGH (ref 1.8–7.4)
NEUTROPHILS NFR BLD AUTO: 87.7 % — HIGH (ref 43–77)
PCO2 BLDA: 35 MMHG — SIGNIFICANT CHANGE UP (ref 32–46)
PCO2 BLDA: 38 MMHG — SIGNIFICANT CHANGE UP (ref 32–46)
PCO2 BLDA: 45 MMHG — SIGNIFICANT CHANGE UP (ref 32–46)
PCO2 BLDA: 72 MMHG — CRITICAL HIGH (ref 32–46)
PH BLD: 7.06 — CRITICAL LOW (ref 7.35–7.45)
PH BLD: 7.07 — CRITICAL LOW (ref 7.35–7.45)
PH BLD: 7.16 — CRITICAL LOW (ref 7.35–7.45)
PH BLD: 7.17 — CRITICAL LOW (ref 7.35–7.45)
PHOSPHATE SERPL-MCNC: 7.7 MG/DL — HIGH (ref 2.5–4.5)
PHOSPHATE SERPL-MCNC: 8.9 MG/DL — HIGH (ref 2.5–4.5)
PLATELET # BLD AUTO: 104 K/UL — LOW (ref 150–400)
PLATELET # BLD AUTO: 70 K/UL — LOW (ref 150–400)
PO2 BLDA: 119 MMHG — HIGH (ref 74–108)
PO2 BLDA: 142 MMHG — HIGH (ref 74–108)
PO2 BLDA: 247 MMHG — HIGH (ref 74–108)
PO2 BLDA: 438 MMHG — HIGH (ref 74–108)
POTASSIUM SERPL-MCNC: 4.5 MMOL/L — SIGNIFICANT CHANGE UP (ref 3.5–5.3)
POTASSIUM SERPL-MCNC: 4.6 MMOL/L — SIGNIFICANT CHANGE UP (ref 3.5–5.3)
POTASSIUM SERPL-MCNC: 5.1 MMOL/L — SIGNIFICANT CHANGE UP (ref 3.5–5.3)
POTASSIUM SERPL-SCNC: 4.5 MMOL/L — SIGNIFICANT CHANGE UP (ref 3.5–5.3)
POTASSIUM SERPL-SCNC: 4.6 MMOL/L — SIGNIFICANT CHANGE UP (ref 3.5–5.3)
POTASSIUM SERPL-SCNC: 5.1 MMOL/L — SIGNIFICANT CHANGE UP (ref 3.5–5.3)
PROT SERPL-MCNC: 2.5 GM/DL — LOW (ref 6–8.3)
PROT SERPL-MCNC: 2.7 GM/DL — LOW (ref 6–8.3)
PROT SERPL-MCNC: 2.8 GM/DL — LOW (ref 6–8.3)
PROTHROM AB SERPL-ACNC: 19.1 SEC — HIGH (ref 9.8–12.7)
PROTHROM AB SERPL-ACNC: 51.5 SEC — HIGH (ref 9.8–12.7)
RBC # BLD: 2.56 M/UL — LOW (ref 3.8–5.2)
RBC # BLD: 2.88 M/UL — LOW (ref 3.8–5.2)
RBC # FLD: 13.4 % — SIGNIFICANT CHANGE UP (ref 11–15)
RBC # FLD: 14.1 % — SIGNIFICANT CHANGE UP (ref 11–15)
SAO2 % BLDA: 100 % — HIGH (ref 92–96)
SAO2 % BLDA: 97 % — HIGH (ref 92–96)
SAO2 % BLDA: 98 % — HIGH (ref 92–96)
SAO2 % BLDA: 98 % — HIGH (ref 92–96)
SODIUM SERPL-SCNC: 150 MMOL/L — HIGH (ref 135–145)
SODIUM SERPL-SCNC: 151 MMOL/L — HIGH (ref 135–145)
SODIUM SERPL-SCNC: 152 MMOL/L — HIGH (ref 135–145)
TROPONIN I SERPL-MCNC: 131 NG/ML — HIGH (ref 0.01–0.04)
TROPONIN I SERPL-MCNC: 36.4 NG/ML — HIGH (ref 0.01–0.04)
WBC # BLD: 22 K/UL — HIGH (ref 3.8–10.5)
WBC # BLD: 37 K/UL — HIGH (ref 3.8–10.5)
WBC # FLD AUTO: 22 K/UL — HIGH (ref 3.8–10.5)
WBC # FLD AUTO: 37 K/UL — HIGH (ref 3.8–10.5)

## 2017-11-05 PROCEDURE — 71010: CPT | Mod: 26,76

## 2017-11-05 PROCEDURE — 99291 CRITICAL CARE FIRST HOUR: CPT

## 2017-11-05 PROCEDURE — 99292 CRITICAL CARE ADDL 30 MIN: CPT

## 2017-11-05 RX ORDER — PHYTONADIONE (VIT K1) 5 MG
5 TABLET ORAL ONCE
Qty: 0 | Refills: 0 | Status: COMPLETED | OUTPATIENT
Start: 2017-11-05 | End: 2017-11-05

## 2017-11-05 RX ORDER — ALBUTEROL 90 UG/1
2 AEROSOL, METERED ORAL EVERY 6 HOURS
Qty: 0 | Refills: 0 | Status: DISCONTINUED | OUTPATIENT
Start: 2017-11-05 | End: 2017-11-05

## 2017-11-05 RX ORDER — SODIUM CHLORIDE 9 MG/ML
1000 INJECTION, SOLUTION INTRAVENOUS
Qty: 0 | Refills: 0 | Status: DISCONTINUED | OUTPATIENT
Start: 2017-11-05 | End: 2017-11-05

## 2017-11-05 RX ORDER — NOREPINEPHRINE BITARTRATE/D5W 8 MG/250ML
0.01 PLASTIC BAG, INJECTION (ML) INTRAVENOUS
Qty: 8 | Refills: 0 | Status: DISCONTINUED | OUTPATIENT
Start: 2017-11-05 | End: 2017-11-05

## 2017-11-05 RX ORDER — CHLORHEXIDINE GLUCONATE 213 G/1000ML
15 SOLUTION TOPICAL
Qty: 0 | Refills: 0 | Status: DISCONTINUED | OUTPATIENT
Start: 2017-11-05 | End: 2017-11-06

## 2017-11-05 RX ORDER — IPRATROPIUM/ALBUTEROL SULFATE 18-103MCG
3 AEROSOL WITH ADAPTER (GRAM) INHALATION EVERY 6 HOURS
Qty: 0 | Refills: 0 | Status: DISCONTINUED | OUTPATIENT
Start: 2017-11-05 | End: 2017-11-06

## 2017-11-05 RX ORDER — SODIUM CHLORIDE 9 MG/ML
1000 INJECTION INTRAMUSCULAR; INTRAVENOUS; SUBCUTANEOUS ONCE
Qty: 0 | Refills: 0 | Status: COMPLETED | OUTPATIENT
Start: 2017-11-05 | End: 2017-11-05

## 2017-11-05 RX ORDER — PANTOPRAZOLE SODIUM 20 MG/1
40 TABLET, DELAYED RELEASE ORAL DAILY
Qty: 0 | Refills: 0 | Status: DISCONTINUED | OUTPATIENT
Start: 2017-11-05 | End: 2017-11-06

## 2017-11-05 RX ORDER — PHENYLEPHRINE HYDROCHLORIDE 10 MG/ML
0.4 INJECTION INTRAVENOUS
Qty: 80 | Refills: 0 | Status: DISCONTINUED | OUTPATIENT
Start: 2017-11-05 | End: 2017-11-05

## 2017-11-05 RX ORDER — PHENYLEPHRINE HYDROCHLORIDE 10 MG/ML
0.4 INJECTION INTRAVENOUS
Qty: 80 | Refills: 0 | Status: DISCONTINUED | OUTPATIENT
Start: 2017-11-05 | End: 2017-11-06

## 2017-11-05 RX ORDER — PIPERACILLIN AND TAZOBACTAM 4; .5 G/20ML; G/20ML
3.38 INJECTION, POWDER, LYOPHILIZED, FOR SOLUTION INTRAVENOUS EVERY 8 HOURS
Qty: 0 | Refills: 0 | Status: DISCONTINUED | OUTPATIENT
Start: 2017-11-05 | End: 2017-11-06

## 2017-11-05 RX ORDER — VANCOMYCIN HCL 1 G
1000 VIAL (EA) INTRAVENOUS EVERY 24 HOURS
Qty: 0 | Refills: 0 | Status: DISCONTINUED | OUTPATIENT
Start: 2017-11-05 | End: 2017-11-06

## 2017-11-05 RX ORDER — VASOPRESSIN 20 [USP'U]/ML
0.04 INJECTION INTRAVENOUS
Qty: 100 | Refills: 0 | Status: DISCONTINUED | OUTPATIENT
Start: 2017-11-05 | End: 2017-11-06

## 2017-11-05 RX ORDER — VANCOMYCIN HCL 1 G
500 VIAL (EA) INTRAVENOUS EVERY 12 HOURS
Qty: 0 | Refills: 0 | Status: DISCONTINUED | OUTPATIENT
Start: 2017-11-05 | End: 2017-11-05

## 2017-11-05 RX ORDER — DEXTROSE 50 % IN WATER 50 %
50 SYRINGE (ML) INTRAVENOUS ONCE
Qty: 0 | Refills: 0 | Status: COMPLETED | OUTPATIENT
Start: 2017-11-05 | End: 2017-11-05

## 2017-11-05 RX ORDER — NOREPINEPHRINE BITARTRATE/D5W 8 MG/250ML
0.1 PLASTIC BAG, INJECTION (ML) INTRAVENOUS
Qty: 16 | Refills: 0 | Status: DISCONTINUED | OUTPATIENT
Start: 2017-11-05 | End: 2017-11-06

## 2017-11-05 RX ORDER — SODIUM BICARBONATE 1 MEQ/ML
100 SYRINGE (ML) INTRAVENOUS ONCE
Qty: 0 | Refills: 0 | Status: COMPLETED | OUTPATIENT
Start: 2017-11-05 | End: 2017-11-05

## 2017-11-05 RX ORDER — SODIUM BICARBONATE 1 MEQ/ML
0.49 SYRINGE (ML) INTRAVENOUS
Qty: 150 | Refills: 0 | Status: DISCONTINUED | OUTPATIENT
Start: 2017-11-05 | End: 2017-11-06

## 2017-11-05 RX ADMIN — Medication 101 MILLIGRAM(S): at 17:07

## 2017-11-05 RX ADMIN — Medication 4.33 MICROGRAM(S)/KG/MIN: at 19:54

## 2017-11-05 RX ADMIN — PIPERACILLIN AND TAZOBACTAM 25 GRAM(S): 4; .5 INJECTION, POWDER, LYOPHILIZED, FOR SOLUTION INTRAVENOUS at 07:31

## 2017-11-05 RX ADMIN — PANTOPRAZOLE SODIUM 40 MILLIGRAM(S): 20 TABLET, DELAYED RELEASE ORAL at 13:17

## 2017-11-05 RX ADMIN — Medication 4.33 MICROGRAM(S)/KG/MIN: at 10:18

## 2017-11-05 RX ADMIN — Medication 1.67 MICROGRAM(S)/KG/MIN: at 01:00

## 2017-11-05 RX ADMIN — Medication 4.33 MICROGRAM(S)/KG/MIN: at 21:03

## 2017-11-05 RX ADMIN — Medication 100 MEQ/KG/HR: at 10:34

## 2017-11-05 RX ADMIN — PIPERACILLIN AND TAZOBACTAM 25 GRAM(S): 4; .5 INJECTION, POWDER, LYOPHILIZED, FOR SOLUTION INTRAVENOUS at 13:18

## 2017-11-05 RX ADMIN — PIPERACILLIN AND TAZOBACTAM 25 GRAM(S): 4; .5 INJECTION, POWDER, LYOPHILIZED, FOR SOLUTION INTRAVENOUS at 21:58

## 2017-11-05 RX ADMIN — SODIUM CHLORIDE 2000 MILLILITER(S): 9 INJECTION INTRAMUSCULAR; INTRAVENOUS; SUBCUTANEOUS at 15:00

## 2017-11-05 RX ADMIN — VASOPRESSIN 2.4 UNIT(S)/MIN: 20 INJECTION INTRAVENOUS at 02:51

## 2017-11-05 RX ADMIN — Medication 3 MILLILITER(S): at 17:18

## 2017-11-05 RX ADMIN — Medication 100 MILLIEQUIVALENT(S): at 09:47

## 2017-11-05 RX ADMIN — SODIUM CHLORIDE 100 MILLILITER(S): 9 INJECTION, SOLUTION INTRAVENOUS at 01:00

## 2017-11-05 RX ADMIN — CHLORHEXIDINE GLUCONATE 15 MILLILITER(S): 213 SOLUTION TOPICAL at 19:53

## 2017-11-05 RX ADMIN — PHENYLEPHRINE HYDROCHLORIDE 13.34 MICROGRAM(S)/KG/MIN: 10 INJECTION INTRAVENOUS at 01:15

## 2017-11-05 RX ADMIN — CHLORHEXIDINE GLUCONATE 15 MILLILITER(S): 213 SOLUTION TOPICAL at 06:16

## 2017-11-05 RX ADMIN — Medication 50 MILLILITER(S): at 09:45

## 2017-11-05 RX ADMIN — Medication 150 MEQ/KG/HR: at 19:53

## 2017-11-05 RX ADMIN — Medication 250 MILLIGRAM(S): at 06:16

## 2017-11-05 RX ADMIN — Medication 3 MILLILITER(S): at 23:40

## 2017-11-05 NOTE — PROGRESS NOTE ADULT - PROBLEM SELECTOR PLAN 4
Hold nephrotoxic meds  Continue aggressive hydration  Trend urine output  Follow up BUN/Creatinine  follow up electrolytes

## 2017-11-05 NOTE — PROGRESS NOTE ADULT - SUBJECTIVE AND OBJECTIVE BOX
HPI:  57F presented with abdominal and back pain which started earlier today. Per ED, she was nauseated and vomiting at presentation. Now states that she has severe back pain (was pacing at the foot of the bed when I entered the room). No HA, CP, SOB. Nauseated. No dysuria, normal BM. (04 Nov 2017 04:41)      24 hr events:      ## ROS:  [ ] unable to obtain  CONSTITUTIONAL: No fever, weight loss, or fatigue  EYES: No eye pain, visual disturbances, or discharge  ENMT:  No difficulty hearing, tinnitus, vertigo; No sinus or throat pain  NECK: No pain or stiffness  RESPIRATORY: No cough, wheezing, chills or hemoptysis; No shortness of breath  CARDIOVASCULAR: No chest pain, palpitations, dizziness, or leg swelling  GASTROINTESTINAL: No abdominal or epigastric pain. No nausea, vomiting, or hematemesis; No diarrhea or constipation. No melena or hematochezia.  GENITOURINARY: No dysuria, frequency, hematuria, or incontinence  NEUROLOGICAL: No headaches, memory loss, loss of strength, numbness, or tremors  SKIN: No itching, burning, rashes, or lesions   LYMPH NODES: No enlarged glands  ENDOCRINE: No heat or cold intolerance; No hair loss  MUSCULOSKELETAL: No joint pain or swelling; No muscle, back, or extremity pain  PSYCHIATRIC: No depression, anxiety, mood swings, or difficulty sleeping  HEME/LYMPH: No easy bruising, or bleeding gums  ALLERGY AND IMMUNOLOGIC: No hives or eczema    ## Labs:  CBC:                        7.8    37.0  )-----------( 70       ( 05 Nov 2017 05:13 )             24.8     Chem:  11-05    151<H>  |  115<H>  |  28<H>  ----------------------------<  118<H>  5.1   |  14<L>  |  2.95<H>    Ca    6.3<LL>      05 Nov 2017 15:13  Phos  7.7     11-05  Mg     2.5     11-05    TPro  2.5<L>  /  Alb  1.2<L>  /  TBili  2.0<H>  /  DBili  x   /  AST  5863<H>  /  ALT  3599<H>  /  AlkPhos  67  11-05    Coags:  PT/INR - ( 05 Nov 2017 15:13 )   PT: 51.5 sec;   INR: 4.58 ratio         PTT - ( 05 Nov 2017 15:13 )  PTT:58.2 sec        ## Imaging:    ## Medications:  piperacillin/tazobactam IVPB. 3.375 Gram(s) IV Intermittent every 8 hours  vancomycin  IVPB 1000 milliGRAM(s) IV Intermittent every 24 hours    norepinephrine Infusion 0.1 MICROgram(s)/kG/Min IV Continuous <Continuous>  phenylephrine    Infusion 0.4 MICROgram(s)/kG/Min IV Continuous <Continuous>    ALBUTerol/ipratropium for Nebulization 3 milliLiter(s) Nebulizer every 6 hours    vasopressin Infusion 0.04 Unit(s)/Min IV Continuous <Continuous>      pantoprazole  Injectable 40 milliGRAM(s) IV Push daily        ## Vitals:  T(C): 35.4 (11-05-17 @ 20:00), Max: 37.8 (11-05-17 @ 15:00)  HR: 107 (11-05-17 @ 22:06) (94 - 123)  BP: 40/16 (11-05-17 @ 21:00) (31/16 - 82/47)  BP(mean): 22 (11-05-17 @ 21:00) (19 - 56)  RR: 24 (11-05-17 @ 22:00) (12 - 24)  SpO2: 100% (11-05-17 @ 17:00) (92% - 100%)  Wt(kg): --  Vent: Mode: AC/ CMV (Assist Control/ Continuous Mandatory Ventilation), RR (machine): 24, RR (patient): 24, TV (machine): 400, FiO2: 30, PEEP: 5, PIP: 37  ABG: ABG - ( 05 Nov 2017 15:00 )  pH: x     /  pCO2: 35    /  pO2: 142   / HCO3: 12    / Base Excess: -15.0 /  SaO2: 98                    11-04 @ 08:01  -  11-05 @ 07:00  --------------------------------------------------------  IN: 4153 mL / OUT: 130 mL / NET: 4023 mL    11-05 @ 07:01 - 11-05 @ 22:56  --------------------------------------------------------  IN: 5899.3 mL / OUT: 460 mL / NET: 5439.3 mL          ## P/E:  Gen: lying comfortably in bed in no apparent distress  HEENT: PERRL, EOMI  Resp: CTA B/L no c/r/w  CVS: S1S2 no m/r/g  Abd: soft NT/ND +BS  Ext: no c/c/e  Neuro: A&Ox3    CENTRAL LINE: [ ] YES [ ] NO  LOCATION:   DATE INSERTED:  REMOVE: [ ] YES [ ] NO      JIMENEZ: [ ] YES [ ] NO    DATE INSERTED:  REMOVE:  [ ] YES [ ] NO      A-LINE:  [ ] YES [ ] NO  LOCATION:   DATE INSERTED:  REMOVE:  [ ] YES [ ] NO  EXPLAIN:    GLOBAL ISSUE/BEST PRACTICE:  Analgesia:  Sedation:  HOB elevation: yes  Stress ulcer prophylaxis:  VTE prophylaxis:  Oral Care:  Glycemic control:  Nutrition:    CODE STATUS: [ ] full code  [ ] DNR  [ ] DNI  [ ] [facility]  Goals of care discussion: [ ] yes HPI:  57F PMH HIV, active crack cocaine use, alcohol abuse, asthma, COPD on home O2 presents with abdominal and back pain. Pt with nausea and emesis x2 days. No reported fevers or chills. No diarrhea. + constipation. Last drink and drug use was the night prior to admission. Here with bowel ischemia requiring emergent OR      24 hr events:  s/p cardiac arrest asystole/v-fib arrest in OR  now in acute renal failure, shock liver, refractory shock state, severe lactic acidosis, coagulopathic with elevated INR, thrombocytopenia  unresponsive off sedation: no withdrawal to pain, overbreathes vent  skin mottling noted left arm and knees  hypoglycemic  s/p pushes of bicarb and dextrose      ## ROS:  [x] unable to obtain due to anoxic encephalopathy, intubation     ## Labs:  CBC:                        7.8    37.0  )-----------( 70       ( 05 Nov 2017 05:13 )             24.8     Chem:  11-05    151<H>  |  115<H>  |  28<H>  -----------------------------------------<  118<H>  5.1          |  14<L>     |  2.95<H>    Ca    6.3<LL>      05 Nov 2017 15:13  Phos  7.7     11-05  Mg     2.5     11-05    TPro  2.5<L>  /  Alb  1.2<L>  /  TBili  2.0<H>  /   AST  5863<H>  /  ALT  3599<H>  /  AlkPhos  67  11-05    Coags:  PT/INR - ( 05 Nov 2017 15:13 )   PT: 51.5 sec;   INR: 4.58 ratio    PTT - ( 05 Nov 2017 15:13 )  PTT:58.2 sec    Troponin I, Serum (11.04.17 @ 00:18)    Troponin I, Serum: <.015 ng/mL            ## Imaging:    ## Medications:  piperacillin/tazobactam IVPB. 3.375 Gram(s) IV Intermittent every 8 hours  vancomycin  IVPB 1000 milliGRAM(s) IV Intermittent every 24 hours    norepinephrine Infusion 0.1 MICROgram(s)/kG/Min IV Continuous <Continuous>  phenylephrine    Infusion 0.4 MICROgram(s)/kG/Min IV Continuous <Continuous>    ALBUTerol/ipratropium for Nebulization 3 milliLiter(s) Nebulizer every 6 hours    vasopressin Infusion 0.04 Unit(s)/Min IV Continuous <Continuous>      pantoprazole  Injectable 40 milliGRAM(s) IV Push daily        ## Vitals:  T(C): 35.4 (11-05-17 @ 20:00), Max: 37.8 (11-05-17 @ 15:00)  HR: 107 (11-05-17 @ 22:06) (94 - 123)  BP: 40/16 (11-05-17 @ 21:00) (31/16 - 82/47)  BP(mean): 22 (11-05-17 @ 21:00) (19 - 56)  RR: 24 (11-05-17 @ 22:00) (12 - 24)  SpO2: 100% (11-05-17 @ 17:00) (92% - 100%)  Wt(kg): --  Vent: Mode: AC/ CMV (Assist Control/ Continuous Mandatory Ventilation), RR (machine): 24, RR (patient): 24, TV (machine): 400, FiO2: 30, PEEP: 5, PIP: 37  ABG: ABG - ( 05 Nov 2017 15:00 )  pH: x     /  pCO2: 35    /  pO2: 142   / HCO3: 12    / Base Excess: -15.0 /  SaO2: 98                    11-04 @ 08:01  -  11-05 @ 07:00  --------------------------------------------------------  IN: 4153 mL / OUT: 130 mL / NET: 4023 mL    11-05 @ 07:01  -  11-05 @ 22:56  --------------------------------------------------------  IN: 5899.3 mL / OUT: 460 mL / NET: 5439.3 mL          ## P/E:  Gen: lying comfortably in bed in no apparent distress  HEENT: PERRL, EOMI  Resp: CTA B/L no c/r/w  CVS: S1S2 no m/r/g  Abd: soft NT/ND +BS  Ext: no c/c/e  Neuro: A&Ox3    CENTRAL LINE: [ ] YES [ ] NO  LOCATION:   DATE INSERTED:  REMOVE: [ ] YES [ ] NO      JIMENEZ: [ ] YES [ ] NO    DATE INSERTED:  REMOVE:  [ ] YES [ ] NO      A-LINE:  [ ] YES [ ] NO  LOCATION:   DATE INSERTED:  REMOVE:  [ ] YES [ ] NO  EXPLAIN:    GLOBAL ISSUE/BEST PRACTICE:  Analgesia:  Sedation:  HOB elevation: yes  Stress ulcer prophylaxis:  VTE prophylaxis:  Oral Care:  Glycemic control:  Nutrition:    CODE STATUS: [ ] full code  [ ] DNR  [ ] DNI  [ ] MOLST  Goals of care discussion: [ ] yes HPI:  57F PMH HIV, active crack cocaine use, alcohol abuse, asthma, COPD on home O2 presents with abdominal and back pain. Pt with nausea and emesis x2 days. No reported fevers or chills. No diarrhea. + constipation. Last drink and drug use was the night prior to admission. Here with bowel ischemia requiring emergent OR      24 hr events:  s/p cardiac arrest asystole/v-fib arrest in OR  now in acute renal failure, shock liver, refractory shock state, severe lactic acidosis, coagulopathic with elevated INR, thrombocytopenia  unresponsive off sedation: no withdrawal to pain, overbreathes vent  skin mottling noted left arm and knees  hypoglycemic  s/p pushes of bicarb and dextrose, now on bicarb drip      ## ROS:  [x] unable to obtain due to anoxic encephalopathy, intubation     ## Labs:  CBC:                        7.8    37.0  )-----------( 70       ( 05 Nov 2017 05:13 )             24.8     Chem:  11-05    151<H>  |  115<H>  |  28<H>  -----------------------------------------<  118<H>  5.1          |  14<L>     |  2.95<H>    Ca    6.3<LL>      05 Nov 2017 15:13  Phos  7.7     11-05  Mg     2.5     11-05    TPro  2.5<L>  /  Alb  1.2<L>  /  TBili  2.0<H>  /   AST  5863<H>  /  ALT  3599<H>  /  AlkPhos  67  11-05    Coags:  PT/INR - ( 05 Nov 2017 15:13 )   PT: 51.5 sec;   INR: 4.58 ratio    PTT - ( 05 Nov 2017 15:13 )  PTT:58.2 sec    Troponin I, Serum (11.04.17 @ 00:18)    Troponin I, Serum: <.015 ng/mL    Troponin I, Serum (11.05.17 @ 05:13)    Troponin I, Serum: 36.400 ng/mL    Troponin I, Serum (11.05.17 @ 15:13)    Troponin I, Serum: 131.000 ng/mL      Lactate, Blood (11.04.17 @ 17:23)    Lactate, Blood: 3.5 mmol/L    Lactate, Blood (11.04.17 @ 20:04)    Lactate, Blood: 1.8 mmol/L    Lactate, Blood (11.05.17 @ 01:48)    Lactate, Blood: 13.6 mmol/L    Lactate, Blood (11.05.17 @ 15:13)    Lactate, Blood: 15.6 mmol/L    Culture - Blood (11.04.17 @ 23:22)    Specimen Source: .Blood Blood    Culture Results: No growth to date.        ## Imaging:  CXR < from: Xray Chest 1 View AP/PA. (11.05.17 @ 04:08) >  There are new patchy opacities at the lung apices,   left greater than right. The lungs are hyperinflated. There is an   endotracheal tube with the tip seen two vertebral bodies above the   yoana. There is an NG tube seen coursing below the diaphragm with the   tip off of the film.        ## Medications:  piperacillin/tazobactam IVPB. 3.375 Gram(s) IV Intermittent every 8 hours  vancomycin  IVPB 1000 milliGRAM(s) IV Intermittent every 24 hours    norepinephrine Infusion 0.1 MICROgram(s)/kG/Min IV Continuous <Continuous>  phenylephrine    Infusion 0.4 MICROgram(s)/kG/Min IV Continuous <Continuous>    ALBUTerol/ipratropium for Nebulization 3 milliLiter(s) Nebulizer every 6 hours    vasopressin Infusion 0.04 Unit(s)/Min IV Continuous <Continuous>      pantoprazole  Injectable 40 milliGRAM(s) IV Push daily        ## Vitals:  T(C): 35.4 (11-05-17 @ 20:00), Max: 37.8 (11-05-17 @ 15:00)  HR: 107 (11-05-17 @ 22:06) (94 - 123)  BP: 40/16 (11-05-17 @ 21:00) (31/16 - 82/47)  BP(mean): 22 (11-05-17 @ 21:00) (19 - 56)  RR: 24 (11-05-17 @ 22:00) (12 - 24)  SpO2: 100% (11-05-17 @ 17:00) (92% - 100%)  Wt(kg): 46.2  Vent: Mode: AC/ CMV (Assist Control/ Continuous Mandatory Ventilation), RR (machine): 24, RR (patient): 24, TV (machine): 400, FiO2: 30, PEEP: 5, PIP: 37  ABG: ABG - ( 05 Nov 2017 15:00 )  pH: 7.17   /  pCO2: 35    /  pO2: 142   / HCO3: 12    / Base Excess: -15.0 /  SaO2: 98              11-04 @ 08:01  - 11-05 @ 07:00  --------------------------------------------------------  IN: 4153 mL / OUT: 130 mL / NET: 4023 mL    11-05 @ 07:01 - 11-05 @ 22:56  --------------------------------------------------------  IN: 5899.3 mL / OUT: 460 mL / NET: 5439.3 mL          ## P/E:  Gen: unresponsive, orally intubated, mechanically vented  HEENT: pupils equal, unreactive, NGT in place, ETT in place  Resp: bilateral mechanical breath sounds, no wheeze, no rhonchi  CVS: tachycardic  Abd: abdominal dressing in place, clean, intact with small area of staining of blood on dressing, no bowel sounds, soft  Ext: bilateral upper and lower extremity edema, mottling of skin noted on upper extremity and lower extremity  Neuro: unresponsive, no withdrawal to pain, no pupillary reflex, no corneals, overbreathes vent    CENTRAL LINE: [x] YES [ ] NO  LOCATION: R IJ   DATE INSERTED: 11/5  REMOVE: [ ] YES [x] NO      JIMENEZ: [x] YES [ ] NO    DATE INSERTED: 11/4  REMOVE:  [ ] YES [x] NO      A-LINE:  [x] YES [ ] NO  LOCATION: L femoral a line   DATE INSERTED: 11/4  REMOVE:  [ ] YES [x] NO      GLOBAL ISSUE/BEST PRACTICE:  Analgesia: n/a  Sedation: n/a  HOB elevation: yes  Stress ulcer prophylaxis: protonix  VTE prophylaxis: bilateral compression device, no pharmacologic prophylaxis due to coagulopathy (elevated INR)  Oral Care: chlorhexidine   Glycemic control: hypoglycemic cont finger sticks  Nutrition: NPO post op    CODE STATUS: [x] full code  [ ] DNR  [ ] DNI  [ ] MOLST  Goals of care discussion: [x] yes

## 2017-11-05 NOTE — PROGRESS NOTE ADULT - SUBJECTIVE AND OBJECTIVE BOX
INTERVAL HPI/OVERNIGHT EVENTS: Arrived in CCU at approx 1am post-op. Since then, additional pressors have been added and a central line inserted. Per RN, patient is making approx 5-10cc urine/hr and remains unresponsive despite being off pressors.    Vital Signs Last 24 Hrs  T(C): 35 (2017 00:29), Max: 36.7 (2017 09:05)  T(F): 95 (2017 00:29), Max: 98 (2017 09:05)  HR: 114 (2017 05:30) (67 - 117)  BP: 32/22 (2017 02:50) (32/22 - 138/98)  BP(mean): 24 (2017 02:50) (24 - 56)  RR: 14 (2017 05:30) (12 - 20)  SpO2: 92% (2017 04:00) (92% - 100%)    MEDICATIONS  (STANDING):  ALBUTerol    90 MICROgram(s) HFA Inhaler 2 Puff(s) Inhalation every 6 hours  chlorhexidine 0.12% Liquid 15 milliLiter(s) Swish and Spit two times a day  influenza   Vaccine 0.5 milliLiter(s) IntraMuscular once  lactated ringers. 1000 milliLiter(s) (100 mL/Hr) IV Continuous <Continuous>  norepinephrine Infusion 0.01 MICROgram(s)/kG/Min (1.667 mL/Hr) IV Continuous <Continuous>  pantoprazole  Injectable 40 milliGRAM(s) IV Push daily  phenylephrine    Infusion 0.4 MICROgram(s)/kG/Min (13.335 mL/Hr) IV Continuous <Continuous>  piperacillin/tazobactam IVPB. 3.375 Gram(s) IV Intermittent every 8 hours  vasopressin Infusion 0.04 Unit(s)/Min (2.4 mL/Hr) IV Continuous <Continuous>    MEDICATIONS  (PRN):      PHYSICAL EXAM    GENERAL: Intubated, unresponsive.  HEENT: Eyes unequal and fixed (L>R)  CHEST/LUNG: CTAB  HEART: Muffled, no murmurs  ABDOMEN: Soft. Midline dressing C/D/I. L femoral a-line in place.    I&O:  I&O's Detail      LABS:                        7.8    37.0  )-----------( x        ( 2017 05:13 )             24.8     11-05    150<H>  |  113<H>  |  29<H>  ----------------------------<  210<H>  4.6   |  14<L>  |  1.89<H>    Ca    7.8<L>      2017 01:48  Phos  8.9     11-05  Mg     3.0     11-05    TPro  2.8<L>  /  Alb  1.4<L>  /  TBili  0.8  /  DBili  x   /  AST  1336<H>  /  ALT  900<H>  /  AlkPhos  64  11-05    PT/INR - ( 2017 01:48 )   PT: 19.1 sec;   INR: 1.73 ratio         PTT - ( 2017 01:48 )  PTT:46.0 sec  Urinalysis Basic - ( 2017 00:18 )    Color: Yellow / Appearance: Clear / S.015 / pH: x  Gluc: x / Ketone: Negative  / Bili: Negative / Urobili: Negative mg/dL   Blood: x / Protein: 15 mg/dL / Nitrite: Negative   Leuk Esterase: Negative / RBC: x / WBC x   Sq Epi: x / Non Sq Epi: Occasional / Bacteria: x        Impression: 57F ex-lap, JULIENNE, SBR c/b PEA arrested with ROSC. Now with persistent lactate acidosis and worsening leukocytosis requiring increased pressors.    Plan:  Appreciate CCU care.  Wean pressors as tolerated  Continue Zosyn, Vanc added  DVT ppx to resume  Plan to ask neurology to assist in determining cognitive status given that patient remains unresponsive off sedatives following ~50 minutes ACLS.

## 2017-11-05 NOTE — PROGRESS NOTE ADULT - SUBJECTIVE AND OBJECTIVE BOX
CC: Patient is a 57y old  Female who presents with a chief complaint of Abdominal and Back Pain (2017 04:41)      HPI:  57F presented with abdominal and back pain which started earlier today. Per ED, she was nauseated and vomiting at presentation. Now states that she has severe back pain (was pacing at the foot of the bed when I entered the room). No HA, CP, SOB. Nauseated. No dysuria, normal BM. (2017 04:41)      Day of Stay: 2    Diagnosis: Small Bowel Obstruction, S/p exploratory laparoscopy, s/p small bowel resection w/ anastomosis, Cardiac Arrest, Ischemic Bowel.      Interval HPI/Overnight Events: Patient was taken with abdominal pain for exp. lap, in waiting became unresponsive with Vfib arrest. Coded for 47 minutes, was taken after code for exp lap which showed bowel ischemia which was resected w/ anastomosis. Patient is now intubated in CCU on 3 different pressors and without sedation. Dilated/fixed pupils, unresponsive to voice/pain, no purposeful movement, absent reflexes - Patient presentation is concerning now for anoxic encephalopathy.     REVIEW OF SYSTEMS: ROS cannot be completed 2/2 mRASS score -5    Lab Results:  CBC  CBC Full  -  ( 2017 05:13 )  WBC Count : 37.0 K/uL  Hemoglobin : 7.8 g/dL  Hematocrit : 24.8 %  Platelet Count - Automated : 70 K/uL  Mean Cell Volume : 97.2 fl  Mean Cell Hemoglobin : 30.4 pg  Mean Cell Hemoglobin Concentration : 31.2 gm/dL  Auto Neutrophil # : x  Auto Lymphocyte # : x  Auto Monocyte # : x  Auto Eosinophil # : x  Auto Basophil # : x  Auto Neutrophil % : x  Auto Lymphocyte % : x  Auto Monocyte % : x  Auto Eosinophil % : x  Auto Basophil % : x    .		Differential:	[] Automated		[] Manual  Chemistry                        7.8    37.0  )-----------( 70       ( 2017 05:13 )             24.8     11-05    151<H>  |  115<H>  |  28<H>  ----------------------------<  118<H>  5.1   |  14<L>  |  2.95<H>    Ca    6.3<LL>      2017 15:13  Phos  7.7     11-05  Mg     2.5     11-05    TPro  2.5<L>  /  Alb  1.2<L>  /  TBili  2.0<H>  /  DBili  x   /  AST  5863<H>  /  ALT  3599<H>  /  AlkPhos  67      LIVER FUNCTIONS - ( 2017 15:13 )  Alb: 1.2 g/dL / Pro: 2.5 gm/dL / ALK PHOS: 67 U/L / ALT: 3599 U/L / AST: 5863 U/L / GGT: x           PT/INR - ( 2017 15:13 )   PT: 51.5 sec;   INR: 4.58 ratio        PTT - ( 2017 15:13 )  PTT:58.2 sec  Urinalysis Basic - ( 2017 00:18 )    Color: Yellow / Appearance: Clear / S.015 / pH: x  Gluc: x / Ketone: Negative  / Bili: Negative / Urobili: Negative mg/dL   Blood: x / Protein: 15 mg/dL / Nitrite: Negative   Leuk Esterase: Negative / RBC: x / WBC x   Sq Epi: x / Non Sq Epi: Occasional / Bacteria: x        ABG - ( 2017 15:00 )  pH: x     /  pCO2: 35    /  pO2: 142   / HCO3: 12    / Base Excess: -15.0 /  SaO2: 98          MICROBIOLOGY/CULTURES:        RADIOLOGY RESULTS: Personally visualized and reviewed  < from: CT Abdomen and Pelvis w/wo IV Cont (17 @ 16:02) >  IMPRESSION:     Multiple dilated, fluid-filled nonenhancing mid small bowel loops   compatible with bowel ischemia which may be due to vascular accident or   less likely a closed loop obstruction. No free air identified.     Peripheralareas of hypoenhancement within the liver and striated   nephrograms are nonspecific and may be in the setting of hypovolemia.   Slitlike IVC and hyperenhancement of the adrenal glands may be seen in   the setting of shock. Correlate clinically.     Moderate to large ascites, increased compared to the prior study.    High density layering within the stomach, possibly due to ingested   material.    < from: Xray Chest 1 View AP/PA. (17 @ 04:08) >  IMPRESSION: New biapical patchy opacities are likely infectious in   etiology. Lines and tubes in position, as described above.      CARDIAC MARKERS ( 2017 05:13 )  36.400 ng/mL / x     / 1668 U/L / x     / x      CARDIAC MARKERS ( 2017 00:18 )  <.015 ng/mL / x     / x     / x     / x            MEDICATIONS  (STANDING):  ALBUTerol/ipratropium for Nebulization 3 milliLiter(s) Nebulizer every 6 hours  chlorhexidine 0.12% Liquid 15 milliLiter(s) Swish and Spit two times a day  influenza   Vaccine 0.5 milliLiter(s) IntraMuscular once  norepinephrine Infusion 0.1 MICROgram(s)/kG/Min IV Continuous <Continuous>  pantoprazole  Injectable 40 milliGRAM(s) IV Push daily  phenylephrine    Infusion 0.4 MICROgram(s)/kG/Min IV Continuous <Continuous>  piperacillin/tazobactam IVPB. 3.375 Gram(s) IV Intermittent every 8 hours  sodium bicarbonate  Infusion 0.487 mEq/kG/Hr IV Continuous <Continuous>  sodium chloride 0.9% Bolus 1000 milliLiter(s) IV Bolus once  vancomycin  IVPB 1000 milliGRAM(s) IV Intermittent every 24 hours  vasopressin Infusion 0.04 Unit(s)/Min IV Continuous <Continuous>    MEDICATIONS  (PRN):      PHYSICAL EXAM:  Vital Signs Last 24 Hrs  T(C): 37.8 (2017 15:00), Max: 37.8 (2017 15:00)  T(F): 100.1 (2017 15:00), Max: 100.1 (2017 15:00)  HR: 110 (2017 17:30) (94 - 123)  BP: 31/16 (2017 10:00) (31/16 - 82/47)  BP(mean): 19 (2017 10:00) (19 - 56)  RR: 24 (2017 17:30) (12 - 24)  SpO2: 100% (2017 17:00) (92% - 100%)    GENERAL: Comatose, NAD  HEAD:  Atraumatic, Normocephalic  EYES: periorbital edema, pupils dilated 7cm, fixed, equal, not reactive to light.   ENMT: Moist mucous membranes, Good dentition, No lesions. ET tube in place. NG tube in place.   NECK: Supple, No JVD, Normal thyroid  NERVOUS SYSTEM:  mRass score -5. No reflexes present. no purposeful movement, unresponsive to voice/pain.   CHEST/LUNG: Clear to auscultation bilaterally; No rales, rhonchi, wheezing, or rubs   HEART: Regular rate and rhythm; s1 s2 auscultated. No murmurs, rubs, or gallops.   ABDOMEN: Soft, Nontender; Bowel sounds absent in all quadrants. No organomegaly or pulsatile masses noted. No bruits auscultated. midline abdominal incision dressing with sanguinous drainage.   EXTREMITIES:  2+ Peripheral Pulses, No clubbing, cyanosis, or edema  LYMPH: No lymphadenopathy noted  SKIN: No rashes or lesions      CENTRAL LINE: [ x ] YES [ ] NO  LOCATION: Right IJ TLC  DATE INSERTED: 17  REMOVE: [ ] YES [x ] NO      JIMENEZ: [x ] YES [ ] NO    DATE INSERTED: 17  REMOVE:  [ ] YES [x ] NO      A-LINE:  [x ] YES [ ] NO  LOCATION:  Left femoral DATE INSERTED: 17  REMOVE:  [ ] YES [x ] NO  EXPLAIN: hemodynamic monitoring.     GLOBAL ISSUE/BEST PRACTICE:  Analgesia: mRass -5   Sedation: mRass - 5  HOB elevation:  10 degrees   Stress ulcer prophylaxis: pantoprazole  VTE prophylaxis: sequential compression device  Oral Care: chlorohexidine   Glycemic control: N/a  Nutrition: NPO    CODE STATUS: [ x ] full code  [ ] DNR  [ ] DNI  [ ] MOLST  Goals of care discussion: [ x ] yes [ ] no

## 2017-11-05 NOTE — PROGRESS NOTE ADULT - ASSESSMENT
Assessment:   This is a 57F with Asthma, Cocaine abuse, HIV, COPD on 2L Home O2, ETOH (48oz wine cooler) daily, presented with abdominal and back pain associated w nausea and vomiting, taken to OR for exp lap and had vfib arrest, was coded for 47 minutes, after ROSC exp lap was performed where ischemic bowel was resected w/ anastomosis Pt is now intubated on 3 pressors, unresponsive to voice/pain mRass score -5, with absent reflexes, and dilated fixed pupils. Presentation is concerning for anoxic encephalopathy.     Plan:     Neuro -   - Off sedation mRass score -5   - Unresponsive to pain/voice  - corneal reflex absent   - Pupils 7mm, fixed  - Periorbital edema.   - patient was coded for vfib arrest x 47 minutes before ROSC  - Presentation concerning for anoxic encephalopathy  - Patient is with multi organ failure and not in condition to be transported off floor for CT scan     Resp -  - intubated   - Hx of asthma, COPD, Home o2 dependent 2L  - on vent PRVC 400/24/40%/5  - Will try spontaneous breathing trial after more concrete goals of care are established  - Cxray with new infiltrate suspicious for HCAP   - Zosyn/Vanco started    CV -   -  no ectopy currently  - Vfib arrest and coded for 47 minutes  - on Norepinephrine, Neosynephrine, Vasopressin   - will titrate until more concrete goals of care are established.   - Troponin 36 peak  - will continue to monitor    GI -   - Ischemic bowel s/p exp lap resection w/ anastomosis.   - No bowel sounds  - NGT to LWS with coffee ground-like contents  -continue pantoprazole for stress ulcer ppx      -   - Acute kidney injury  - BUN/Creat now 28/2.95  - Urine output <10mL/hr   - Presentation consistent with multi organ failure  - Continue bicarb gtt    Metabolic -   - Blood gas showing 7.17/35/142/12  - Hypernatremia, Hyperkalemia, Hyperchloremia   - Lactate 15.6  - Metabolic acidosis w/ elevated anion gap  -  Continue bicarb gtt   - Will do serial reassessments on ABG    Endo -   - No hx of DM   - Hypoglycemia this AM  - treated with dextrose 50%  - continue NPO 2/2 abdominal surgery   - receiving dextrose 5% continuous infusion from bicarb gtt.     ID -   - PNA on CXR  - on vanco/zosyn  - will continue to monitor     Heme   - Coffee ground emesis   - hgb/hct 7.8/24.8 after procedure  - will order accordingly after goals of care plan is discussed in greater detail.

## 2017-11-05 NOTE — PROGRESS NOTE ADULT - PROBLEM SELECTOR PLAN 1
-Patient currently on Full vent support  -titrate settings to maintain SaO2 >90%, or pH >7.25  -consider low titdal volume ventilation strategy w/ goal Tv 4-6 cc/kg of ideal body weight  -plateu pressure goal <30  -Peridex oral care and VAP prophylaxis with HOB 30 degrees   -aggressive chest PT and suctioning   -daily sedation vacation with spontaneous breathing trial if clinical condition warrants, discuss with respiratory therapy

## 2017-11-05 NOTE — PROGRESS NOTE ADULT - PROBLEM SELECTOR PLAN 2
-30 cc/kg fluid challenge without improvement in blood pressure  -patient currently on Levophed, will titrate for MAP 65-70  -repeat lactate  -blood/urine/sputum cultures, then initiate broad spectrum antibiotics  -follow cultures and narrow antibitoics as able  -goal UOP >0.5 cc/kg/hr  -procalcitonin

## 2017-11-05 NOTE — PROGRESS NOTE ADULT - ASSESSMENT
57F PMH HIV, active crack cocaine use, alcohol abuse, asthma, COPD on home O2 presents with abdominal and back pain. Pt with nausea and emesis x2 days. No reported fevers or chills. No diarrhea. + constipation. Last drink and drug use was the night prior to admission. Here with bowel ischemia requiring emergent OR. Hospital course complicated by prolonged cardiac arrest (asystole/v-fib) , 57F PMH HIV, active crack cocaine use, alcohol abuse, asthma, COPD on home O2 presents with abdominal and back pain. Pt with nausea and emesis x2 days. No reported fevers or chills. No diarrhea. + constipation. Last drink and drug use was the night prior to admission. Here with bowel ischemia requiring emergent OR. Hospital course complicated by prolonged cardiac arrest (asystole/v-fib) in OR prior to start of procedure, sepsis, septic shock, acute renal failure/ATN, shock liver, anoxic encephalopathy, coagulopathy, acute respiratory failure.     1. CV  - s/p prolonged asystolic/V fib cardiac arrest  - shock state: maxed on 3 pressors still with episodes of hypotension,  IVF bolus x1 given  - s/p 5L IVF in last 24 hours  - significant elevation in troponin post prolonged arrest with CPR and multiple shocks delivered: conservative medical managmenet in setting of multiorgan failure    2. PULM  - cont mechanical ventilation  - unable to wean due to hemodynamic instability    3. GI  - ischemic bowel POD #1 s/p resection  - NGT to suction  - protonix drip  - lactate on the rise despite IVF resuscitation  - shock liver: supportive care      4. HEME  - coagulopathy  - vit K IV for elevated INR  - thrombocytopenia and elevated coags, concern for development of DIC    5. RENAL  - ARF, anuric, severe metabolic acidosis  - s/p pushes of bicarb  - now on bicarb drip  - nephrology consult      6. ID  - cont zosyn  - vanco, check vanco level in setting of acute renal failure  - follow up surgical cx, blood cx    7. NEURO  - anoxic encephalopathy  - unresponsive    8. GEN  - multiple long family discussions regarding poor prognosis, multiorgan failure, and inevitable death  - family aware but wants pt to remain full code with continued resuscitative efforts    Total Critical Care Time: 90 min

## 2017-11-05 NOTE — PROCEDURE NOTE - NSPROCDETAILS_GEN_ALL_CORE
location identified, draped/prepped, sterile technique used, needle inserted/introduced/sutured in place/all materials/supplies accounted for at end of procedure/positive blood return obtained via catheter/Seldinger technique/connected to a pressurized flush line
guidewire recovered/sterile technique, catheter placed/lumen(s) aspirated and flushed/sterile dressing applied/ultrasound guidance

## 2017-11-05 NOTE — PROGRESS NOTE ADULT - SUBJECTIVE AND OBJECTIVE BOX
57y  Female  No Known Allergies    CC: Patient is a 57y old  Female who presented with a chief complaint of Abdominal and Back Pain     HPI:  57F presented with abdominal and back pain . Initially in the ER she was nauseated and vomiting at presentation. She was admitted to the floor where she experienced some coffee ground emesis  and repeat cat scan was suspicious for  ischemic bowel, but a rapid response was called for increasing lethargy,  hypotension and was found to have an acute abdomin. She was taken to the operating room when during induction she experienced cardiac arrest with 50 min code blue run time and ROCS. She was stablized from a hemodynamic perspective and the surgery was performed. Tonight he is s/p POD # 2 ex lap lysis of adhesions and small bowel resection with primary anastomosis     PAST MEDICAL & SURGICAL HISTORY:  Cocaine abuse  Asthma  HIV (human immunodeficiency virus infection)  H/O:   S/P appendectomy    FAMILY HISTORY:  Family history of drug abuse (Mother)      Vitals   Vital Signs Last 24 Hrs  T(C): 35.7 (2017 03:09), Max: 37.8 (2017 15:00)  T(F): 96.3 (2017 03:09), Max: 100.1 (2017 15:00)  HR: 102 (2017 05:00) (102 - 123)  BP: 40/16 (2017 21:00) (31/16 - 40/16)  BP(mean): 22 (2017 21:00) (19 - 22)  RR: 24 (2017 05:00) (14 - 24)  SpO2: 100% (2017 17:00) (94% - 100%)  ABG - ( 2017 15:00 )  pH: x     /  pCO2: 35    /  pO2: 142   / HCO3: 12    / Base Excess: -15.0 /  SaO2: 98                I&O's Summary    2017 08:01  -  2017 07:00  --------------------------------------------------------  IN: 4153 mL / OUT: 130 mL / NET: 4023 mL    2017 07:01  -  2017 05:20  --------------------------------------------------------  IN: 6734.1 mL / OUT: 465 mL / NET: 6269.1 mL        LABS      151<H>  |  115<H>  |  28<H>  ----------------------------<  118<H>  5.1   |  14<L>  |  2.95<H>    Ca    6.3<LL>      2017 15:13  Phos  7.7       Mg     2.5         TPro  2.5<L>  /  Alb  1.2<L>  /  TBili  2.0<H>  /  DBili  x   /  AST  5863<H>  /  ALT  3599<H>  /  AlkPhos  67                            5.2    34.8  )-----------( 56       ( 2017 04:01 )             16.8     PT/INR - ( 2017 15:13 )   PT: 51.5 sec;   INR: 4.58 ratio         PTT - ( 2017 15:13 )  PTT:58.2 sec  CARDIAC MARKERS ( 2017 15:13 )  131.000 ng/mL / x     / 2706 U/L / x     / x      CARDIAC MARKERS ( 2017 05:13 )  36.400 ng/mL / x     / 1668 U/L / x     / x          LIVER FUNCTIONS - ( 2017 15:13 )  Alb: 1.2 g/dL / Pro: 2.5 gm/dL / ALK PHOS: 67 U/L / ALT: 3599 U/L / AST: 5863 U/L / GGT: x           CAPILLARY BLOOD GLUCOSE      POCT Blood Glucose.: <10 mg/dL (2017 04:30)        VENT SETTINGS   Mode: AC/ CMV (Assist Control/ Continuous Mandatory Ventilation)  RR (machine): 24  TV (machine): 400  FiO2: 30  PEEP: 5  ITime: 1  MAP: 18  PIP: 37      Meds  MEDICATIONS  (STANDING):  ALBUTerol/ipratropium for Nebulization 3 milliLiter(s) Nebulizer every 6 hours  chlorhexidine 0.12% Liquid 15 milliLiter(s) Swish and Spit two times a day  influenza   Vaccine 0.5 milliLiter(s) IntraMuscular once  norepinephrine Infusion 0.1 MICROgram(s)/kG/Min (4.331 mL/Hr) IV Continuous <Continuous>  pantoprazole  Injectable 40 milliGRAM(s) IV Push daily  phenylephrine    Infusion 0.4 MICROgram(s)/kG/Min (13.335 mL/Hr) IV Continuous <Continuous>  piperacillin/tazobactam IVPB. 3.375 Gram(s) IV Intermittent every 8 hours  sodium bicarbonate  Infusion 0.487 mEq/kG/Hr (150 mL/Hr) IV Continuous <Continuous>  vancomycin  IVPB 1000 milliGRAM(s) IV Intermittent every 24 hours  vasopressin Infusion 0.04 Unit(s)/Min (2.4 mL/Hr) IV Continuous <Continuous>      REVIEW OF SYSTEMS:    Unable to obtain due to neruologic state and mechanical ventilation     Physicial Exam:     Constitutional: NAD, well-groomed, well-developed  HEENT: PERRLA, EOMI, no drainage or redness  Neck: No bruits; no thyromegaly or nodules,  No JVD  Back: Normal spine flexure, No CVA tenderness, No deformity or limitation of movement  Respiratory: Breath Sounds equal & clear to percussion & auscultation, no accessory muscle use  Cardiovascular: Regular rate & rhythm, normal S1, S2; no murmurs, gallops or rubs; no S3, S4  Gastrointestinal: Soft, non-tender, non distended no hepatosplenomegaly, normal bowel sounds  Extremities: No peripheral edema, No cyanosis, clubbing   Vascular: Equal and normal pulses: 2+ peripheral pulses throughout  Neurological: GCS:    A&O x 3; no sensory, motor  deficits, normal reflexes  Psychiatric: Normal mood, normal affect  Musculoskeletal: No joint pain, swelling or deformity; no limitation of movement  Skin: No rashes

## 2017-11-05 NOTE — BRIEF OPERATIVE NOTE - OPERATION/FINDINGS
Extensive adhesions of bowel to peritoneum and adjacent loops of bowel/intraperitoneal organs.  Profuse bloody ascites  Internal hernia of ischemic small bowel  (~2 feet) with single band.  Necrotic small bowel resected with primary anastomosis

## 2017-11-05 NOTE — PROGRESS NOTE ADULT - ASSESSMENT
57 year old female post cardiac arrest with ROSC , POD #2 s/p exl lap lysisi of adhsions / small bowel resection with primary anastomsis now with anoxic brain injury and multi system organ failure    Critical Care time: 35 mins assessing presenting problems of acute illness that poses high probability of life threatening deterioration or end organ damage/dysfunction.  Medical decision making inculding Initiating plan of care, reviewing data, reviewing radiology, discussing with multidisciplinary team, non inclusive of procedures, discussing goals of care with patient/family

## 2017-11-06 DIAGNOSIS — N17.9 ACUTE KIDNEY FAILURE, UNSPECIFIED: ICD-10-CM

## 2017-11-06 DIAGNOSIS — R79.1 ABNORMAL COAGULATION PROFILE: ICD-10-CM

## 2017-11-06 DIAGNOSIS — K72.00 ACUTE AND SUBACUTE HEPATIC FAILURE WITHOUT COMA: ICD-10-CM

## 2017-11-06 DIAGNOSIS — A41.9 SEPSIS, UNSPECIFIED ORGANISM: ICD-10-CM

## 2017-11-06 DIAGNOSIS — J96.21 ACUTE AND CHRONIC RESPIRATORY FAILURE WITH HYPOXIA: ICD-10-CM

## 2017-11-06 DIAGNOSIS — R74.8 ABNORMAL LEVELS OF OTHER SERUM ENZYMES: ICD-10-CM

## 2017-11-06 LAB
ALBUMIN SERPL ELPH-MCNC: 1 G/DL — LOW (ref 3.3–5)
ALP SERPL-CCNC: 122 U/L — HIGH (ref 40–120)
ALT FLD-CCNC: 8718 U/L — HIGH (ref 12–78)
ANION GAP SERPL CALC-SCNC: 29 MMOL/L — HIGH (ref 5–17)
AST SERPL-CCNC: HIGH U/L (ref 15–37)
BASE EXCESS BLDA CALC-SCNC: -21.7 MMOL/L — LOW (ref -2–2)
BILIRUB SERPL-MCNC: 2.6 MG/DL — HIGH (ref 0.2–1.2)
BLD GP AB SCN SERPL QL: SIGNIFICANT CHANGE UP
BLOOD GAS COMMENTS: SIGNIFICANT CHANGE UP
BLOOD GAS SOURCE: SIGNIFICANT CHANGE UP
BUN SERPL-MCNC: 32 MG/DL — HIGH (ref 7–23)
CALCIUM SERPL-MCNC: 5.9 MG/DL — CRITICAL LOW (ref 8.5–10.1)
CHLORIDE SERPL-SCNC: 108 MMOL/L — SIGNIFICANT CHANGE UP (ref 96–108)
CO2 SERPL-SCNC: 11 MMOL/L — LOW (ref 22–31)
CREAT SERPL-MCNC: 3.51 MG/DL — HIGH (ref 0.5–1.3)
GLUCOSE BLDC GLUCOMTR-MCNC: 106 MG/DL — HIGH (ref 70–99)
GLUCOSE BLDC GLUCOMTR-MCNC: 129 MG/DL — HIGH (ref 70–99)
GLUCOSE BLDC GLUCOMTR-MCNC: 148 MG/DL — HIGH (ref 70–99)
GLUCOSE BLDC GLUCOMTR-MCNC: 216 MG/DL — HIGH (ref 70–99)
GLUCOSE BLDC GLUCOMTR-MCNC: 316 MG/DL — HIGH (ref 70–99)
GLUCOSE BLDC GLUCOMTR-MCNC: 329 MG/DL — HIGH (ref 70–99)
GLUCOSE BLDC GLUCOMTR-MCNC: 69 MG/DL — LOW (ref 70–99)
GLUCOSE BLDC GLUCOMTR-MCNC: <10 MG/DL — CRITICAL LOW (ref 70–99)
GLUCOSE SERPL-MCNC: 156 MG/DL — HIGH (ref 70–99)
HCO3 BLDA-SCNC: 7 MMOL/L — LOW (ref 21–29)
HCT VFR BLD CALC: 16.8 % — CRITICAL LOW (ref 34.5–45)
HGB BLD-MCNC: 5.2 G/DL — CRITICAL LOW (ref 11.5–15.5)
HOROWITZ INDEX BLDA+IHG-RTO: 0.3 — SIGNIFICANT CHANGE UP
INR BLD: 7.31 RATIO — CRITICAL HIGH (ref 0.88–1.16)
MAGNESIUM SERPL-MCNC: 2.6 MG/DL — SIGNIFICANT CHANGE UP (ref 1.6–2.6)
MCHC RBC-ENTMCNC: 30.3 PG — SIGNIFICANT CHANGE UP (ref 27–34)
MCHC RBC-ENTMCNC: 31.1 GM/DL — LOW (ref 32–36)
MCV RBC AUTO: 97.3 FL — SIGNIFICANT CHANGE UP (ref 80–100)
PCO2 BLDA: 29 MMHG — LOW (ref 32–46)
PH BLD: 7.03 — CRITICAL LOW (ref 7.35–7.45)
PHOSPHATE SERPL-MCNC: 8.5 MG/DL — HIGH (ref 2.5–4.5)
PLATELET # BLD AUTO: 56 K/UL — LOW (ref 150–400)
PO2 BLDA: 130 MMHG — HIGH (ref 74–108)
POTASSIUM SERPL-MCNC: 5.5 MMOL/L — HIGH (ref 3.5–5.3)
POTASSIUM SERPL-SCNC: 5.5 MMOL/L — HIGH (ref 3.5–5.3)
PROT SERPL-MCNC: 2.4 GM/DL — LOW (ref 6–8.3)
PROTHROM AB SERPL-ACNC: 83 SEC — HIGH (ref 9.8–12.7)
RBC # BLD: 1.73 M/UL — LOW (ref 3.8–5.2)
RBC # FLD: 14.1 % — SIGNIFICANT CHANGE UP (ref 11–15)
SAO2 % BLDA: 97 % — HIGH (ref 92–96)
SODIUM SERPL-SCNC: 148 MMOL/L — HIGH (ref 135–145)
TROPONIN I SERPL-MCNC: 117 NG/ML — HIGH (ref 0.01–0.04)
WBC # BLD: 34.8 K/UL — HIGH (ref 3.8–10.5)
WBC # FLD AUTO: 34.8 K/UL — HIGH (ref 3.8–10.5)

## 2017-11-06 RX ORDER — DEXTROSE 50 % IN WATER 50 %
50 SYRINGE (ML) INTRAVENOUS ONCE
Qty: 0 | Refills: 0 | Status: COMPLETED | OUTPATIENT
Start: 2017-11-06 | End: 2017-11-06

## 2017-11-06 RX ORDER — CALCIUM GLUCONATE 100 MG/ML
1 VIAL (ML) INTRAVENOUS ONCE
Qty: 0 | Refills: 0 | Status: COMPLETED | OUTPATIENT
Start: 2017-11-06 | End: 2017-11-06

## 2017-11-06 RX ADMIN — Medication 150 MEQ/KG/HR: at 03:00

## 2017-11-06 RX ADMIN — PIPERACILLIN AND TAZOBACTAM 25 GRAM(S): 4; .5 INJECTION, POWDER, LYOPHILIZED, FOR SOLUTION INTRAVENOUS at 05:02

## 2017-11-06 RX ADMIN — Medication 200 GRAM(S): at 07:00

## 2017-11-06 RX ADMIN — VASOPRESSIN 2.4 UNIT(S)/MIN: 20 INJECTION INTRAVENOUS at 02:58

## 2017-11-06 RX ADMIN — Medication 4.33 MICROGRAM(S)/KG/MIN: at 00:07

## 2017-11-06 RX ADMIN — PHENYLEPHRINE HYDROCHLORIDE 13.34 MICROGRAM(S)/KG/MIN: 10 INJECTION INTRAVENOUS at 00:07

## 2017-11-06 RX ADMIN — Medication 4.33 MICROGRAM(S)/KG/MIN: at 02:58

## 2017-11-06 RX ADMIN — Medication 3 MILLILITER(S): at 05:27

## 2017-11-06 RX ADMIN — CHLORHEXIDINE GLUCONATE 15 MILLILITER(S): 213 SOLUTION TOPICAL at 05:34

## 2017-11-06 RX ADMIN — PHENYLEPHRINE HYDROCHLORIDE 13.34 MICROGRAM(S)/KG/MIN: 10 INJECTION INTRAVENOUS at 02:58

## 2017-11-06 RX ADMIN — Medication 50 MILLILITER(S): at 04:35

## 2017-11-06 RX ADMIN — Medication 250 MILLIGRAM(S): at 05:26

## 2017-11-06 NOTE — PROVIDER CONTACT NOTE (CRITICAL VALUE NOTIFICATION) - PERSON GIVING RESULT:
Archie De Leon
Archie De Leon
Luis/Charla
Shahrzad Novak
joy fishman
lab dept (Abel Catalan)
lab dept (geronimo Catalan)
Abel Smith

## 2017-11-06 NOTE — DIETITIAN INITIAL EVALUATION ADULT. - OTHER INFO
Pt seen for CCU admission.  Unable to obtain wt & diet hx due to intubation on mech vent support. S/p small bowel resection &  presents with anoxic brain injury with multi system organ failure & family requesting all medical support @ this time.  NGT [id number] output. Pt seen for CCU admission && RN consult 11/4 for BMI<18 & N/V >3 days. Unable to obtain wt & diet hx due to intubation on mech vent support. S/p small bowel resection &  presents with anoxic brain injury with multi system organ failure & family requesting all medical support @ this time.  NGT [id number] output.

## 2017-11-06 NOTE — PROVIDER CONTACT NOTE (CRITICAL VALUE NOTIFICATION) - ACTION/TREATMENT ORDERED:
Primary RN SHERRY Marcial notified of results given / Made aware LIZETTE Munoz notified
liter bolus in progress, increased iv fluids, vitamin k for liver

## 2017-11-06 NOTE — PROGRESS NOTE ADULT - ASSESSMENT
56yo F hx HIV, cocaine abuse, COPD admitted ischemic bowel- POD #2 s/p ex lap lysis of adhesions, small bowel resection c/b cardiac arrest now with anoxic brain injury and multi system organ failure.   Pt prognosis grim as has no significant neurological function and has progressive multiorgan failure causing severe metabolic disturbances. Pt n maximum support via ventilator and vasopressors and bicarb gtt with continued worsening. Family informed of grim prognosis yesterday and again today. Pt wth repeat cardiac arrest today w/ ROSC but family agreed with DNR subsequently and pt .     Critical CareTime 60min

## 2017-11-06 NOTE — DIETITIAN INITIAL EVALUATION ADULT. - PHYSICAL APPEARANCE
Pt thin; BMI=20.8; +3 generalized edema; unable to perform nutrition focus physical exam due to medical acuity/other (specify)

## 2017-11-06 NOTE — PROGRESS NOTE ADULT - SUBJECTIVE AND OBJECTIVE BOX
56yo F hx HIV, cocaine abuse admitted ischemic bowel- POD #2 s/p ex lap lysis of adhesions, small bowel resection c/b cardiac arrest now with anoxic brain injury and multi system organ failure.    Pt unresponsive on vent w/o sedation  No significant UO  Severe acidosis despite bicarb gtt.  Max dose of three pressors.                          5.2    34.8  )-----------( 56       ( 06 Nov 2017 04:01 )             16.8   11-06    148<H>  |  108  |  32<H>  ----------------------------<  156<H>  5.5<H>   |  11<L>  |  3.51<H>    Ca    5.9<LL>      06 Nov 2017 04:01  Phos  8.5     11-06  Mg     2.6     11-06    TPro  2.4<L>  /  Alb  1.0<L>  /  TBili  2.6<H>  /  DBili  x   /  AST  >96002<H>  /  ALT  8718<H>  /  AlkPhos  122<H>  11-06    ICU Vital Signs Last 24 Hrs  T(C): 35.5 (06 Nov 2017 11:14), Max: 37.8 (05 Nov 2017 15:00)  T(F): 95.9 (06 Nov 2017 11:14), Max: 100.1 (05 Nov 2017 15:00)  HR: 0 (06 Nov 2017 11:53) (0 - 126)  BP: 40/16 (05 Nov 2017 21:00) (40/16 - 40/16)  BP(mean): 22 (05 Nov 2017 21:00) (22 - 22)  ABP: 44/31 (06 Nov 2017 11:53) (44/31 - 231/130)  ABP(mean): 19 (06 Nov 2017 11:53) (19 - 175)  RR: 0 (06 Nov 2017 11:53) (0 - 30)  SpO2: 88% (06 Nov 2017 10:47) (88% - 100%)    unresponsive, intubated  pupils b/l fixed  no gag  b/l rhonchi  s1s2 reg tachy  firm belly, midline dressing  no edema

## 2017-11-06 NOTE — PROVIDER CONTACT NOTE (CRITICAL VALUE NOTIFICATION) - TEST AND RESULT REPORTED:
H/H 5.2/16.8
Ca level 5.9
INR 7.31
Lactate 13.6
calcium 6.3
cpk 1668, troponin 36.40
cpk 2706 troponin 131
lactate 15.6, sgot 5863, sgpt 359

## 2017-11-06 NOTE — PROGRESS NOTE ADULT - ATTENDING COMMENTS
Pt with persistent, worsening abdominal pain    BP earlier dropped to low ~65 systolic; RRT called, BP improved to ~110 systolic after 2L IV fluids.  Pt remains lethargic but arousable  WBC dramatically up to 26,900  Lactate level p to 3.5  Creat up to 1.66 (from 0.78 earlier)    On exam, abdomen quite distended, rigid, diffusely, markedly tender w guarding in all quadrants    Delayed CT imaging now felt to be very concerning for small bowel ischemia    Clinical exam, labs and CT all consistent with acute abdomen, likely due to mesenteric ischemia, quite possibly related to cocaine abuse    Plan is for emergent laparotomy, likely bowel resection, possible stoma    Situation discussed with patient and patient's daughter including nature of , indications   for and risks/ benefits of surgery, including risk of death with or without surgery.    Patient appears to understand and consents to surgery as advised.
I have seen and examined the patient and agree with Ida Perez the surgical PA's note.  At the present time the patient is in multi-system organ failure - she is unresponsive - I believe there is little hope for survival - the family is aware according to the ICU staff    Dr. Nixon Austin contact information [phone]
Pt seen and examined  Agree with note which was reviewed and edited where appropriate.  D/W patient, RN, residents and Fellow

## 2017-11-06 NOTE — DIETITIAN INITIAL EVALUATION ADULT. - NS AS NUTRI INTERV ENTERAL NUTRITION
Schedule/Composition/Route/Concentration/Rate/Volume/Insert enteral feeding tube/Consider NGT when medically feasible per family request

## 2017-11-06 NOTE — PROGRESS NOTE ADULT - SUBJECTIVE AND OBJECTIVE BOX
INTERVAL HPI/OVERNIGHT EVENTS:  Pt. seen and examined at bedside. Remains intubated on 2 pressors (levo, vaso). Nonresponsive to verbal or physical stimuli.     Vital Signs Last 24 Hrs  T(C): 35.6 (06 Nov 2017 05:47), Max: 37.8 (05 Nov 2017 15:00)  T(F): 96 (06 Nov 2017 05:47), Max: 100.1 (05 Nov 2017 15:00)  HR: 111 (06 Nov 2017 06:48) (67 - 123)  BP: 40/16 (05 Nov 2017 21:00) (31/16 - 40/16)  BP(mean): 22 (05 Nov 2017 21:00) (19 - 22)  RR: 24 (06 Nov 2017 06:48) (14 - 24)  SpO2: 100% (05 Nov 2017 17:00) (94% - 100%)      PHYSICAL EXAM:    GENERAL: Intubated, nonresponsive.   EYES: Periorbital edema. pupils fixed and dilated  CHEST/LUNG: Clear to ausculation, bilaterally   HEART: +S1S2  ABDOMEN: Aquacel dressing in place with blood staining noted, no active bleeding through dressing, firmly distended, No BS, Nontender.  EXTREMITIES: Left femoral a-line in place.     I&O's Detail    04 Nov 2017 08:01  -  05 Nov 2017 07:00  --------------------------------------------------------  IN:    IV PiggyBack: 100 mL    lactated ringers.: 700 mL    norepinephrine Infusion: 2251 mL    phenylephrine   Infusion: 840 mL    Solution: 250 mL    vasopressin Infusion: 12 mL  Total IN: 4153 mL    OUT:    Indwelling Catheter - Urethral: 80 mL    Nasoenteral Tube: 50 mL  Total OUT: 130 mL    Total NET: 4023 mL      05 Nov 2017 07:01  -  06 Nov 2017 06:51  --------------------------------------------------------  IN:    IV PiggyBack: 500 mL    lactated ringers.: 200 mL    norepinephrine Infusion: 194.6 mL    norepinephrine Infusion: 1428.4 mL    phenylephrine   Infusion: 1352.5 mL    sodium bicarbonate  Infusion: 500 mL    sodium bicarbonate  Infusion: 1800 mL    Sodium Chloride 0.9% IV Bolus: 1000 mL    Solution: 250 mL    vasopressin Infusion: 45.6 mL  Total IN: 7271.1 mL    OUT:    Indwelling Catheter - Urethral: 15 mL    Nasoenteral Tube: 1000 mL  Total OUT: 1015 mL    Total NET: 6256.1 mL    LABS:                        5.2    34.8  )-----------( 56       ( 06 Nov 2017 04:01 )             16.8     11-06    148<H>  |  108  |  32<H>  ----------------------------<  156<H>  5.5<H>   |  11<L>  |  3.51<H>    Ca    5.9<LL>      06 Nov 2017 04:01  Phos  8.5     11-06  Mg     2.6     11-06    TPro  2.4<L>  /  Alb  1.0<L>  /  TBili  2.6<H>  /  DBili  x   /  AST  >17720<H>  /  ALT  8718<H>  /  AlkPhos  122<H>  11-06    PT/INR - ( 06 Nov 2017 04:12 )   PT: 83.0 sec;   INR: 7.31 ratio         PTT - ( 05 Nov 2017 15:13 )  PTT:58.2 sec    Culture Results:   No growth to date. (11-04 @ 23:22)  Culture Results:   No growth to date. (11-04 @ 23:22)    RADIOLOGY & ADDITIONAL STUDIES:  < from: Xray Chest 1 View AP/PA. (11.05.17 @ 04:08) >  IMPRESSION: New biapical patchy opacities are likely infectious in   etiology. Lines and tubes in position, as described above.    < end of copied text >    Impression: 56yo female post cardiac arrest with ROSC , POD #2 s/p ex lap lysis of adhesions, small bowel resection now with anoxic brain injury and multi system organ failure. H/H 5.2/16.8 this morning.     Plan:  -Appreciate CCU care, 2 units PRBC ordered this morning  -Wean pressors as tolerated  -Continue Zosyn/Vanc  -DVT ppx  -neurology to assist in determining cognitive status given that patient remains unresponsive off sedatives following ~50 minutes ACLS  -follow labs  -Will d/w surgical attending

## 2017-11-06 NOTE — DISCHARGE NOTE FOR THE EXPIRED PATIENT - HOSPITAL COURSE
57F PMH HIV, active crack cocaine use, alcohol abuse, asthma, COPD on home O2 presents with abdominal and back pain. Pt with nausea and emesis x2 days. No reported fevers or chills. No diarrhea. + constipation. Last drink and drug use was the night prior to admission. Rapid response on  for hypotension. Found to have abdominal distention, tenderness on exam. Taken to OR emergently for ex lap. Found to have  complicated by prolonged cardiac arrest (asystole/v-fib) in OR prior to start of procedure, sepsis, septic shock, acute renal failure/ATN, shock liver, anoxic encephalopathy, coagulopathy, acute respiratory failure. Pt with refractory shock state maxed on vasopressors. Had another cardiac arrest (PEA)  with successful ROSC in 6 min. Spoke with family regarding extremely poor prognosis and that pt is dieing. Daughters and sons understand. Agrees for DNR. Pt  11:53AM on  with family at bedside.

## 2017-11-06 NOTE — DIETITIAN INITIAL EVALUATION ADULT. - PERTINENT LABORATORY DATA
11-06 Na 148 mmol/L<H> Glu 156 mg/dL<H> K+ 5.5 mmol/L<H> Cr  3.51 mg/dL<H> BUN 32 mg/dL<H> Phos 8.5 mg/dL<H> Alb 1.0 g/dL<L> PAB n/a   Hgb 5.2 g/dL<LL> Hct 16.8 %<LL>

## 2017-11-06 NOTE — PROVIDER CONTACT NOTE (CRITICAL VALUE NOTIFICATION) - NAME OF MD/NP/PA/DO NOTIFIED:
LIZETTE Munoz
Dr. Booth
Dr. Booth, MARIBELL España
Dr. Booth, MARIBELL Esp[place]
LIZETTE Martins
LIZETTE Maurice
LIZETTE Munoz
MARIBELL España

## 2017-11-06 NOTE — CONSULT NOTE ADULT - SUBJECTIVE AND OBJECTIVE BOX
Information from chart:  "Patient is a 57y old  Female who presents with a chief complaint of Abdominal and Back Pain (2017 04:41)    HPI:  57F presented with abdominal and back pain which started earlier today. Per ED, she was nauseated and vomiting at presentation. Now states that she has severe back pain (was pacing at the foot of the bed when I entered the room). No HA, CP, SOB. Nauseated. No dysuria, normal BM. (2017 04:41)   "  Patient is post op for exploratory laparotomy; currently on 2 pressors support; post cardiac arrest, anuric; severe lactic acidosis.  Currently code blue called    PAST MEDICAL & SURGICAL HISTORY:  Cocaine abuse  Asthma  HIV (human immunodeficiency virus infection)  H/O:   S/P appendectomy    FAMILY HISTORY:  Family history of drug abuse (Mother)    Allergies    No Known Allergies    Intolerances      Home Medications:  famotidine 10 mg oral tablet: 1 tab(s) orally 2 times a day (2017 22:26)  Isentress 400 mg oral tablet: 1 tab(s) orally 2 times a day (2017 00:53)  Norvir 100 mg oral tablet: 1 tab(s) orally once a day (2017 12:23)  Prezista 800 mg oral tablet: 1 tab(s) orally once a day (2017 00:53)    MEDICATIONS  (STANDING):  ALBUTerol/ipratropium for Nebulization 3 milliLiter(s) Nebulizer every 6 hours  chlorhexidine 0.12% Liquid 15 milliLiter(s) Swish and Spit two times a day  influenza   Vaccine 0.5 milliLiter(s) IntraMuscular once  norepinephrine Infusion 0.1 MICROgram(s)/kG/Min (4.331 mL/Hr) IV Continuous <Continuous>  pantoprazole  Injectable 40 milliGRAM(s) IV Push daily  phenylephrine    Infusion 0.4 MICROgram(s)/kG/Min (13.335 mL/Hr) IV Continuous <Continuous>  piperacillin/tazobactam IVPB. 3.375 Gram(s) IV Intermittent every 8 hours  sodium bicarbonate  Infusion 0.487 mEq/kG/Hr (150 mL/Hr) IV Continuous <Continuous>  vasopressin Infusion 0.04 Unit(s)/Min (2.4 mL/Hr) IV Continuous <Continuous>    MEDICATIONS  (PRN):    Vital Signs Last 24 Hrs  T(C): 36.1 (2017 08:00), Max: 37.8 (2017 15:00)  T(F): 97 (2017 08:00), Max: 100.1 (2017 15:00)  HR: 100 (2017 10:00) (0 - 123)  BP: 40/16 (2017 21:00) (40/16 - 40/16)  BP(mean): 22 (2017 21:00) (22 - 22)  RR: 30 (2017 10:00) (16 - 30)  SpO2: 100% (2017 17:00) (94% - 100%)  Mode: AC/ CMV (Assist Control/ Continuous Mandatory Ventilation)  RR (machine): 30  TV (machine): 400  FiO2: 30  PEEP: 5  ITime: 1  MAP: 22  PIP: 62    Daily     Daily Weight in k.2 (2017 09:37)  CAPILLARY BLOOD GLUCOSE  316 (2017 09:27)      POCT Blood Glucose.: 329 mg/dL (2017 10:45)  POCT Blood Glucose.: 316 mg/dL (2017 09:22)  POCT Blood Glucose.: 216 mg/dL (2017 07:14)  POCT Blood Glucose.: 106 mg/dL (2017 05:21)  POCT Blood Glucose.: <10 mg/dL (2017 04:30)  POCT Blood Glucose.: 69 mg/dL (2017 04:29)  POCT Blood Glucose.: 129 mg/dL (2017 02:31)  POCT Blood Glucose.: 148 mg/dL (2017 01:31)  POCT Blood Glucose.: 144 mg/dL (2017 23:35)  POCT Blood Glucose.: 70 mg/dL (2017 21:24)  POCT Blood Glucose.: 172 mg/dL (2017 19:24)  POCT Blood Glucose.: 120 mg/dL (2017 17:04)  POCT Blood Glucose.: 137 mg/dL (2017 14:57)  POCT Blood Glucose.: 137 mg/dL (2017 12:58)    PHYSICAL EXAM:      T(C): 36.1 (17 @ 08:00), Max: 37.8 (17 @ 15:00)  HR: 100 (17 @ 10:00) (0 - 123)  BP: 40/16 (17 @ 21:00) (40/16 - 40/16)  RR: 30 (17 @ 10:00) (16 - 30)  SpO2: 100% (17 @ 17:00) (94% - 100%)  Wt(kg): --  Respiratory: clear anteriorly, decreased BS at bases  Cardiovascular: S1 S2  Gastrointestinal: distended no BS  Extremities:  1 edema                  148<H>  |  108  |  32<H>  ----------------------------<  156<H>  5.5<H>   |  11<L>  |  3.51<H>    Ca    5.9<LL>      2017 04:01  Phos  8.5       Mg     2.6         TPro  2.4<L>  /  Alb  1.0<L>  /  TBili  2.6<H>  /  DBili  x   /  AST  >37837<H>  /  ALT  8718<H>  /  AlkPhos  122<H>                            5.2    34.8  )-----------( 56       ( 2017 04:01 )             16.8       ABG - ( 2017 09:03 )  pH: x     /  pCO2: 29    /  pO2: 130   / HCO3: 7     / Base Excess: -21.7 /  SaO2: 97              Assessment and Plan    ALENA ischemic ATN; cardiac arrest, 3 pressor support.  Supportive medical care; too hemodynamically unstable for HD consideration.  Prognosis grave; discussed with ICU/ CCU team;

## 2017-11-08 DIAGNOSIS — K55.9 VASCULAR DISORDER OF INTESTINE, UNSPECIFIED: ICD-10-CM

## 2017-11-08 DIAGNOSIS — R65.21 SEVERE SEPSIS WITH SEPTIC SHOCK: ICD-10-CM

## 2017-11-08 DIAGNOSIS — G93.1 ANOXIC BRAIN DAMAGE, NOT ELSEWHERE CLASSIFIED: ICD-10-CM

## 2017-11-08 DIAGNOSIS — Z79.52 LONG TERM (CURRENT) USE OF SYSTEMIC STEROIDS: ICD-10-CM

## 2017-11-08 DIAGNOSIS — Z79.51 LONG TERM (CURRENT) USE OF INHALED STEROIDS: ICD-10-CM

## 2017-11-08 DIAGNOSIS — J18.9 PNEUMONIA, UNSPECIFIED ORGANISM: ICD-10-CM

## 2017-11-08 DIAGNOSIS — E87.2 ACIDOSIS: ICD-10-CM

## 2017-11-08 DIAGNOSIS — D68.9 COAGULATION DEFECT, UNSPECIFIED: ICD-10-CM

## 2017-11-08 DIAGNOSIS — E87.0 HYPEROSMOLALITY AND HYPERNATREMIA: ICD-10-CM

## 2017-11-08 DIAGNOSIS — K56.2 VOLVULUS: ICD-10-CM

## 2017-11-08 DIAGNOSIS — J96.21 ACUTE AND CHRONIC RESPIRATORY FAILURE WITH HYPOXIA: ICD-10-CM

## 2017-11-08 DIAGNOSIS — I46.9 CARDIAC ARREST, CAUSE UNSPECIFIED: ICD-10-CM

## 2017-11-08 DIAGNOSIS — J44.9 CHRONIC OBSTRUCTIVE PULMONARY DISEASE, UNSPECIFIED: ICD-10-CM

## 2017-11-08 DIAGNOSIS — F10.10 ALCOHOL ABUSE, UNCOMPLICATED: ICD-10-CM

## 2017-11-08 DIAGNOSIS — K72.00 ACUTE AND SUBACUTE HEPATIC FAILURE WITHOUT COMA: ICD-10-CM

## 2017-11-08 DIAGNOSIS — Z90.49 ACQUIRED ABSENCE OF OTHER SPECIFIED PARTS OF DIGESTIVE TRACT: ICD-10-CM

## 2017-11-08 DIAGNOSIS — A41.9 SEPSIS, UNSPECIFIED ORGANISM: ICD-10-CM

## 2017-11-08 DIAGNOSIS — D69.6 THROMBOCYTOPENIA, UNSPECIFIED: ICD-10-CM

## 2017-11-08 DIAGNOSIS — Z87.891 PERSONAL HISTORY OF NICOTINE DEPENDENCE: ICD-10-CM

## 2017-11-08 DIAGNOSIS — Z66 DO NOT RESUSCITATE: ICD-10-CM

## 2017-11-08 DIAGNOSIS — Z21 ASYMPTOMATIC HUMAN IMMUNODEFICIENCY VIRUS [HIV] INFECTION STATUS: ICD-10-CM

## 2017-11-08 DIAGNOSIS — E87.5 HYPERKALEMIA: ICD-10-CM

## 2017-11-08 DIAGNOSIS — N17.0 ACUTE KIDNEY FAILURE WITH TUBULAR NECROSIS: ICD-10-CM

## 2017-11-08 DIAGNOSIS — K66.0 PERITONEAL ADHESIONS (POSTPROCEDURAL) (POSTINFECTION): ICD-10-CM

## 2017-11-08 DIAGNOSIS — Z99.81 DEPENDENCE ON SUPPLEMENTAL OXYGEN: ICD-10-CM

## 2017-11-08 DIAGNOSIS — K46.0 UNSPECIFIED ABDOMINAL HERNIA WITH OBSTRUCTION, WITHOUT GANGRENE: ICD-10-CM

## 2017-11-08 DIAGNOSIS — J96.22 ACUTE AND CHRONIC RESPIRATORY FAILURE WITH HYPERCAPNIA: ICD-10-CM

## 2017-11-08 DIAGNOSIS — J45.909 UNSPECIFIED ASTHMA, UNCOMPLICATED: ICD-10-CM

## 2017-11-08 DIAGNOSIS — F14.19 COCAINE ABUSE WITH UNSPECIFIED COCAINE-INDUCED DISORDER: ICD-10-CM

## 2017-11-09 LAB
CULTURE RESULTS: SIGNIFICANT CHANGE UP
CULTURE RESULTS: SIGNIFICANT CHANGE UP
SPECIMEN SOURCE: SIGNIFICANT CHANGE UP
SPECIMEN SOURCE: SIGNIFICANT CHANGE UP

## 2017-11-10 LAB
CULTURE RESULTS: SIGNIFICANT CHANGE UP
SPECIMEN SOURCE: SIGNIFICANT CHANGE UP
SURGICAL PATHOLOGY FINAL REPORT - CH: SIGNIFICANT CHANGE UP

## 2017-11-15 NOTE — PATIENT PROFILE ADULT. - NSTOBACCONEVERSMOKERY/N_GEN_A

## 2019-12-20 NOTE — PROGRESS NOTE ADULT - PROBLEM SELECTOR PLAN 3
Addended by: AYAKA GUERRA on: 12/20/2019 03:10 PM     Modules accepted: Orders    
no sctive bleeding , consider reversal

## 2021-09-14 NOTE — PATIENT PROFILE ADULT. - PRO MENTAL HEALTH SX RECENT
rn pt ot sw a angry/feeling down/feeling hopelessness/bouts of crying/poor appetite/feeling depressed

## 2022-09-14 NOTE — PATIENT PROFILE ADULT. - BILL OF RIGHTS/ADMISSION INFORMATION PROVIDED TO:
Alert-The patient is alert, awake and responds to voice. The patient is oriented to time, place, and person. The triage nurse is able to obtain subjective information. Patient

## 2023-04-17 NOTE — CHART NOTE - NSCHARTNOTEFT_GEN_A_CORE
Patient's (Body mass index is 27.49 kg/m².) indicates that they are overweight with health conditions that include hypertension and diabetes mellitus . Weight is improving with lifestyle modifications. BMI is is above average; BMI management plan is completed. We discussed portion control and increasing exercise.    Upon Nutritional Assessment by the Registered Dietitian your patient was determined to meet criteria / has evidence of the following diagnosis/diagnoses:          [ ]  Mild Protein Calorie Malnutrition        [ ]  Moderate Protein Calorie Malnutrition        [x ] Severe Protein Calorie Malnutrition        [ ] Unspecified Protein Calorie Malnutrition        [x ] Underweight / BMI <19        [ ] Morbid Obesity / BMI > 40      Findings as based on:  •  Comprehensive nutrition assessment and consultation  •  Calorie counts (nutrient intake analysis)  •  Food acceptance and intake status from observations by staff  •  Follow up  •  Patient education  •  Intervention secondary to interdisciplinary rounds  •   concerns      Treatment:    The following diet has been recommended: Regular diet + Ensure Enlive x 3/day (provides 1050 kcal, 60 g protein) + evening snack (PBJ sandwich)      PROVIDER Section:     By signing this assessment you are acknowledging and agree with the diagnosis/diagnoses assigned by the Registered Dietitian    Comments:

## 2023-11-16 NOTE — DIETITIAN INITIAL EVALUATION ADULT. - DIET TYPE
Ensure Enlive x 2/day; 1/24/supplement (specify)/regular Doxycycline Pregnancy And Lactation Text: This medication is Pregnancy Category D and not consider safe during pregnancy. It is also excreted in breast milk but is considered safe for shorter treatment courses.

## 2024-01-07 NOTE — PROGRESS NOTE ADULT - ATTENDING COMMENTS
Patient states, her daughter wants her to go to [facility] after DC,  will get PT evaluation.   will check pulse ox on room air.
pt has multiple prolems  1 asthma 2 alcohol abuse and asthmaprognosis is guarded continued present treatment  and follow with  pulmologist
witcbch  history of long standing copd and asthmaand cocaine abusur  , have shortness of breath and cough. cbc cmp
DVT Px with SC heparin
SC heparin for DVT Px
asthma hiv andcocaine absuser
(1) Other Diagnosis